# Patient Record
Sex: FEMALE | Race: WHITE | URBAN - METROPOLITAN AREA
[De-identification: names, ages, dates, MRNs, and addresses within clinical notes are randomized per-mention and may not be internally consistent; named-entity substitution may affect disease eponyms.]

---

## 2017-01-31 ENCOUNTER — OFFICE VISIT (OUTPATIENT)
Dept: INTERNAL MEDICINE CLINIC | Age: 37
End: 2017-01-31

## 2017-01-31 VITALS
TEMPERATURE: 97.8 F | SYSTOLIC BLOOD PRESSURE: 100 MMHG | HEART RATE: 68 BPM | WEIGHT: 135.8 LBS | BODY MASS INDEX: 21.83 KG/M2 | DIASTOLIC BLOOD PRESSURE: 62 MMHG | HEIGHT: 66 IN

## 2017-01-31 DIAGNOSIS — J01.90 ACUTE NON-RECURRENT SINUSITIS, UNSPECIFIED LOCATION: ICD-10-CM

## 2017-01-31 DIAGNOSIS — J40 BRONCHITIS: Primary | ICD-10-CM

## 2017-01-31 DIAGNOSIS — R05.9 COUGH: ICD-10-CM

## 2017-01-31 PROCEDURE — 99213 OFFICE O/P EST LOW 20 MIN: CPT | Performed by: INTERNAL MEDICINE

## 2017-01-31 RX ORDER — FLUTICASONE PROPIONATE 50 MCG
2 SPRAY, SUSPENSION (ML) NASAL DAILY
Qty: 1 BOTTLE | Refills: 0 | Status: SHIPPED | OUTPATIENT
Start: 2017-01-31

## 2017-01-31 RX ORDER — DOXYCYCLINE HYCLATE 100 MG/1
100 CAPSULE ORAL 2 TIMES DAILY
Qty: 20 CAPSULE | Refills: 0 | Status: SHIPPED | OUTPATIENT
Start: 2017-01-31 | End: 2017-02-10

## 2017-01-31 ASSESSMENT — ENCOUNTER SYMPTOMS
SINUS PRESSURE: 1
COUGH: 1
STRIDOR: 0
WHEEZING: 0

## 2022-11-04 ENCOUNTER — OFFICE VISIT (OUTPATIENT)
Dept: ORTHOPEDIC SURGERY | Age: 42
End: 2022-11-04
Payer: COMMERCIAL

## 2022-11-04 VITALS — WEIGHT: 140 LBS | HEIGHT: 66 IN | BODY MASS INDEX: 22.5 KG/M2 | RESPIRATION RATE: 12 BRPM

## 2022-11-04 DIAGNOSIS — M25.561 ACUTE PAIN OF RIGHT KNEE: Primary | ICD-10-CM

## 2022-11-04 DIAGNOSIS — M25.562 ACUTE PAIN OF LEFT KNEE: ICD-10-CM

## 2022-11-04 DIAGNOSIS — S83.241A ACUTE MEDIAL MENISCUS TEAR, RIGHT, INITIAL ENCOUNTER: ICD-10-CM

## 2022-11-04 PROCEDURE — 99203 OFFICE O/P NEW LOW 30 MIN: CPT | Performed by: ORTHOPAEDIC SURGERY

## 2022-11-04 NOTE — PROGRESS NOTES
Catarina Rueda seen today for ongoing bilateral knee pain. Right is much worse than left. She started having pain in April. She participates in curling. She has medial knee pain associated with squatting. She injured her left knee in July twisting injury on a water slide. From a right knee standpoint she was treated in physical therapy in April of this year and felt better but when she returned to running and curling had recurrent pain. Pain is 3 or 4 out of 10. Its anterior medial.  It hurts with deep bends. She works in logistics. She is otherwise healthy. She has never had knee surgery. She takes intermittent ibuprofen and uses ice. History: Patient's relevant past family, medical, and social history are reviewed as part of today's visit. ROS of pertinent positives and negatives as above; otherwise negative. General Exam:    Vitals: Resp. rate 12, height 5' 6\" (1.676 m), weight 140 lb (63.5 kg), not currently breastfeeding. Constitutional: Patient is adequately groomed with no evidence of malnutrition  Mental Status: The patient is oriented to time, place and person. The patient's mood and affect are appropriate. Gait:  Patient walks with normal gait and station. Lymphatic: The lymphatic examination bilaterally reveals all areas to be without enlargement or induration. Vascular: Examination reveals no swelling or calf tenderness. Peripheral pulses are palpable and 2+. Neurological: The patient has good coordination. There is no weakness or sensory deficit. Skin:    Head/Neck: inspection reveals no rashes, ulcerations or lesions. Trunk:  inspection reveals no rashes, ulcerations or lesions. Right Lower Extremity: inspection reveals no rashes, ulcerations or lesions. Left Lower Extremity: inspection reveals no rashes, ulcerations or lesions. Examination of the bilateral hips reveals normal flexion and extension. There is no restriction in rotation.   There is no tenderness to palpation anteriorly posteriorly or laterally. Left knee shows full range of motion with no swelling. She has no joint line pain. She has very mild discomfort over the anterior aspect of the knee with palpation. She is stable. Calf is soft. Right knee has restriction of flexion 0 to 120 degrees which is about 15 to 20 degrees less than the left. She is stable. She has pain over the medial joint line and pain with June's maneuver. Calf is soft. X-rays were obtained today in the office and interpreted by me AP standing, PA flex, merchant, and lateral views of both knees. These demonstrate: No bony abnormalities      Assessment: Right knee restriction of motion with medial sided pain concerning for medial meniscus tear. She has had physical therapy without durable relief. She has been using ice and ibuprofen but still has symptoms and abnormal physical exam.    Plan: MRI right knee.     Follow-up with me after the scan

## 2022-11-15 ENCOUNTER — OFFICE VISIT (OUTPATIENT)
Dept: ORTHOPEDIC SURGERY | Age: 42
End: 2022-11-15
Payer: COMMERCIAL

## 2022-11-15 VITALS — BODY MASS INDEX: 22.5 KG/M2 | WEIGHT: 140 LBS | HEIGHT: 66 IN

## 2022-11-15 DIAGNOSIS — S83.241A ACUTE MEDIAL MENISCUS TEAR, RIGHT, INITIAL ENCOUNTER: Primary | ICD-10-CM

## 2022-11-15 DIAGNOSIS — M25.561 ACUTE PAIN OF RIGHT KNEE: ICD-10-CM

## 2022-11-15 PROCEDURE — E0114 CRUTCH UNDERARM PAIR NO WOOD: HCPCS | Performed by: ORTHOPAEDIC SURGERY

## 2022-11-15 PROCEDURE — 99214 OFFICE O/P EST MOD 30 MIN: CPT | Performed by: ORTHOPAEDIC SURGERY

## 2022-11-15 NOTE — PROGRESS NOTES
Stephie Ortiz returns today to follow-up her right knee. Pain is about 1 out of 10. General Exam:    Vitals: Height 5' 6\" (1.676 m), weight 140 lb (63.5 kg), not currently breastfeeding. Constitutional: Patient is adequately groomed with no evidence of malnutrition  Mental Status: The patient is oriented to time, place and person. The patient's mood and affect are appropriate. Right knee today has pain over the medial joint line and pain with June's maneuver with a small effusion. She has mild crepitation. Calf is soft. She is stable. Right knee MRI is reviewed. It demonstrates:      Exam Date: 11/10/2022   Exam Description: MR Right Knee w/o Contrast            HISTORY:  Acute pain of right knee. Evaluate for right knee pain. TECHNICAL FACTORS:  Long- and short-axis fat- and water-weighted images were performed. COMPARISON:  None. FINDINGS:  ACL, PCL, LCL, MCL, flexor mechanism and extensor mechanism are intact. Thickened inflamed MCL. Sprain, scarring and capsulitis present. Edema changes within the medial tibia. Subcortical stress or insufficiency microfracturing    present. Dissecting dehisced Baker's cyst posteromedially. Lateral meniscus is intact. Medial meniscus demonstrates a 3-4 cm trizonal tear posterior    body. Subchondral marrow changes medial tibia and femur may be reactive or be from contusion    or stress/insufficiency injury. CONCLUSION:   1. 3-4 cm trizonal tear posterior horn and body of the medial meniscus. 2. Grade 1 MCL sprain, scarring and capsulitis. 3. Edema changes within the medial tibia and less so femur. Subcortical stress or insufficiency    microfracturing present. 4. Dehisced or leaking Baker's cyst posteromedially. 5. 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        I reviewed these findings on the report and the images with the patient and personally interpreted the scan. Assessment: Traumatic medial meniscus tear right knee. Plan: Right knee arthroscopy with partial medial meniscectomy. We reviewed the risks, benefits, and alternatives to surgery. The alternatives include conservative management including medications, injections, and physical therapy as well as observation. Risks of surgery include but are not limited to persistent pain, instability, and reinjury. Risks also include risk of infection which could result in the need for further surgery and long-term use of antibiotics. Risks also include deep venous thromboses and pulmonary emboli. Risks also include problems with anesthesia including but not limited to cardiovascular compromise , stroke,  and death. The patient understands that the goal of surgery is to improve pain and function but that can never be guaranteed. She is already had therapy without substantial improvement. She is modified her life. She really wants to get back to curling. I demonstrated to her arthroscopic incisions. She understands her greatest risk of surgery is that of ongoing pain and worsening arthritis. She would still like to proceed as soon as possible. She understands a full return to crawling is on the order of 6 to 8 weeks. All questions have been answered.   I will see her in the operating room at some point in the next several weeks after she gets preoperative medical clearance

## 2022-11-18 ENCOUNTER — TELEPHONE (OUTPATIENT)
Dept: ORTHOPEDIC SURGERY | Age: 42
End: 2022-11-18

## 2022-11-18 NOTE — TELEPHONE ENCOUNTER
Auth: NPR  Date: 12/07/22  Reference # None  Spoke with: None  Type of SX: Outpatient  Location: [facility]  CPT: 99447   DX: [phone]  SX area: Rt knee  Insurance: [employer]
patient currently not on any antibiotics.

## 2022-11-30 NOTE — PROGRESS NOTES
C-diff Questionnaire:     * Admitted with diarrhea? [] YES    [x]  NO     *Prior history of C-Diff. In last 3 months? [] YES    [x]  NO     *Antibiotic use in the past 6-8 weeks? [x]  NO    []  YES      If yes, which: REASON_________________     *Prior hospitalization or nursing home in the last month? []  YES    [x]  NO     [facility]. .call before you fall    [address] time___945       Surgery [id number]    Do not eat or drink anything after 12:00 midnight prior to your surgery. This includes water chewing gum, mints and ice chips- the Day of Surgery. You may brush your teeth and gargle the morning of your surgery, but do not swallow the water     Please see your family doctor/pediatrician for a history and physical and/or questions concerning medications. Bring any test results/reports from your physicians office. If you are under the care of a heart doctor or specialist doctor, please be aware that you may be asked to them for clearance    You may be asked to stop blood thinners such as Coumadin, Plavix, Fragmin, Lovenox, etc., or any anti-inflammatories such as:  Aspirin, Ibuprofen, Advil, Naproxen prior to your surgery. We also ask that you stop any OTC medications such as fish oil, vitamin E, glucosamine, garlic, Multivitamins, COQ 10, etc.    We ask that you do not smoke 24 hours prior to surgery  We ask that you do not  drink any alcoholic beverages 24 hours prior to surgery     You must make arrangements for a responsible adult to take you home after your surgery. For your safety you will not be allowed to leave alone or drive yourself home. Your surgery will be cancelled if you do not have a ride home. Also for your safety, it is strongly suggested that someone stay with you the first 24 hours after your surgery.      A parent or legal guardian must accompany a child scheduled for surgery and plan to stay at the hospital until the child is discharged. Please do not bring other children with you. For your comfort, please wear simple loose fitting clothing to the hospital.  Please do not bring valuables. Do not wear any make-up or nail polish on your fingers or toes. For your safety, please do not wear any jewelry or body piercing's on the day of surgery. All jewelry must be removed. If you have dentures, they will be removed before going to operating room. For your convenience, we will provide you with a container. If you wear contact lenses or glasses, they will be removed, please bring a case for them. If you have a living will and a durable power of  for healthcare, please bring in a copy. As part of our patient safety program to minimize surgical site infections, we ask you to do the following:    Please notify your surgeon if you develop any illness between         now and the day of your surgery. This includes a cough, cold, fever, sore throat, nausea,         or vomiting, and diarrhea, etc.   Please notify your surgeon if you experience dizziness, shortness         of breath or blurred vision between now and the time of your surgery. Do not shave your operative site 96 hours prior to surgery. For face and neck surgery, men may use an electric razor 48 hours   prior to surgery. You may shower the night before surgery or the morning of   your surgery with an antibacterial soap. You will need to bring a photo ID and insurance card     If you use a C-pap or Bi-pap machine, please bring your machine with you to the hospital     Our goal is to provide you with excellent care, therefore, visitors will be limited to so that we may focus on providing this care for you. Please contact your surgeon office, if you have any further questions.                  [facility] phone number:  [phone] PAT fax number:  [phone]    Please note these are generalized instructions for all surgical cases, you may be provided with more specific instructions according to your surgery.

## 2022-12-06 ENCOUNTER — TELEPHONE (OUTPATIENT)
Dept: ORTHOPEDIC SURGERY | Age: 42
End: 2022-12-06

## 2022-12-06 ENCOUNTER — ANESTHESIA EVENT (OUTPATIENT)
Dept: OPERATING ROOM | Age: 42
End: 2022-12-06
Payer: COMMERCIAL

## 2022-12-06 DIAGNOSIS — M25.561 ACUTE PAIN OF RIGHT KNEE: ICD-10-CM

## 2022-12-06 DIAGNOSIS — S83.241A ACUTE MEDIAL MENISCUS TEAR, RIGHT, INITIAL ENCOUNTER: Primary | ICD-10-CM

## 2022-12-06 RX ORDER — HYDROCODONE BITARTRATE AND ACETAMINOPHEN 5; 325 MG/1; MG/1
1 TABLET ORAL EVERY 4 HOURS PRN
Qty: 20 TABLET | Refills: 0 | Status: SHIPPED | OUTPATIENT
Start: 2022-12-07 | End: 2022-12-12

## 2022-12-06 RX ORDER — PROMETHAZINE HYDROCHLORIDE 25 MG/1
25 TABLET ORAL EVERY 6 HOURS PRN
Qty: 30 TABLET | Refills: 0 | Status: SHIPPED | OUTPATIENT
Start: 2022-12-07 | End: 2022-12-13

## 2022-12-06 RX ORDER — DOCUSATE SODIUM 100 MG/1
100 CAPSULE, LIQUID FILLED ORAL 2 TIMES DAILY
Qty: 60 CAPSULE | Refills: 0 | Status: SHIPPED | OUTPATIENT
Start: 2022-12-07

## 2022-12-06 NOTE — TELEPHONE ENCOUNTER
Called and left message for patient. Patient will arrive at [facility] for surgery at [facility] with crutches. NPO at [facility].

## 2022-12-07 ENCOUNTER — ANESTHESIA (OUTPATIENT)
Dept: OPERATING ROOM | Age: 42
End: 2022-12-07
Payer: COMMERCIAL

## 2022-12-07 ENCOUNTER — HOSPITAL ENCOUNTER (OUTPATIENT)
Age: 42
Setting detail: OUTPATIENT SURGERY
Discharge: HOME | End: 2022-12-07
Attending: ORTHOPAEDIC SURGERY | Admitting: ORTHOPAEDIC SURGERY
Payer: COMMERCIAL

## 2022-12-07 VITALS
HEART RATE: 66 BPM | SYSTOLIC BLOOD PRESSURE: 115 MMHG | RESPIRATION RATE: 18 BRPM | OXYGEN SATURATION: 100 % | TEMPERATURE: 97.4 F | HEIGHT: 66 IN | BODY MASS INDEX: 22.5 KG/M2 | WEIGHT: 140 LBS | DIASTOLIC BLOOD PRESSURE: 62 MMHG

## 2022-12-07 LAB — PREGNANCY, URINE: NEGATIVE

## 2022-12-07 PROCEDURE — 2500000003 HC RX 250 WO HCPCS: Performed by: ORTHOPAEDIC SURGERY

## 2022-12-07 PROCEDURE — 6360000002 HC RX W HCPCS: Performed by: ORTHOPAEDIC SURGERY

## 2022-12-07 PROCEDURE — 3600000014 HC SURGERY LEVEL 4 ADDTL 15MIN: Performed by: ORTHOPAEDIC SURGERY

## 2022-12-07 PROCEDURE — 6360000002 HC RX W HCPCS

## 2022-12-07 PROCEDURE — 7100000000 HC PACU RECOVERY - FIRST 15 MIN: Performed by: ORTHOPAEDIC SURGERY

## 2022-12-07 PROCEDURE — 2580000003 HC RX 258: Performed by: ANESTHESIOLOGY

## 2022-12-07 PROCEDURE — 2500000003 HC RX 250 WO HCPCS

## 2022-12-07 PROCEDURE — 7100000001 HC PACU RECOVERY - ADDTL 15 MIN: Performed by: ORTHOPAEDIC SURGERY

## 2022-12-07 PROCEDURE — 6370000000 HC RX 637 (ALT 250 FOR IP): Performed by: ANESTHESIOLOGY

## 2022-12-07 PROCEDURE — 2580000003 HC RX 258: Performed by: ORTHOPAEDIC SURGERY

## 2022-12-07 PROCEDURE — 7100000010 HC PHASE II RECOVERY - FIRST 15 MIN: Performed by: ORTHOPAEDIC SURGERY

## 2022-12-07 PROCEDURE — 2709999900 HC NON-CHARGEABLE SUPPLY: Performed by: ORTHOPAEDIC SURGERY

## 2022-12-07 PROCEDURE — 3600000004 HC SURGERY LEVEL 4 BASE: Performed by: ORTHOPAEDIC SURGERY

## 2022-12-07 PROCEDURE — 7100000011 HC PHASE II RECOVERY - ADDTL 15 MIN: Performed by: ORTHOPAEDIC SURGERY

## 2022-12-07 PROCEDURE — 3700000001 HC ADD 15 MINUTES (ANESTHESIA): Performed by: ORTHOPAEDIC SURGERY

## 2022-12-07 PROCEDURE — 3700000000 HC ANESTHESIA ATTENDED CARE: Performed by: ORTHOPAEDIC SURGERY

## 2022-12-07 PROCEDURE — 2720000010 HC SURG SUPPLY STERILE: Performed by: ORTHOPAEDIC SURGERY

## 2022-12-07 PROCEDURE — 84703 CHORIONIC GONADOTROPIN ASSAY: CPT

## 2022-12-07 RX ORDER — DEXAMETHASONE SODIUM PHOSPHATE 4 MG/ML
INJECTION, SOLUTION INTRA-ARTICULAR; INTRALESIONAL; INTRAMUSCULAR; INTRAVENOUS; SOFT TISSUE PRN
Status: DISCONTINUED | OUTPATIENT
Start: 2022-12-07 | End: 2022-12-07 | Stop reason: SDUPTHER

## 2022-12-07 RX ORDER — FENTANYL CITRATE 50 UG/ML
50 INJECTION, SOLUTION INTRAMUSCULAR; INTRAVENOUS EVERY 5 MIN PRN
Status: DISCONTINUED | OUTPATIENT
Start: 2022-12-07 | End: 2022-12-07 | Stop reason: HOSPADM

## 2022-12-07 RX ORDER — ONDANSETRON 2 MG/ML
4 INJECTION INTRAMUSCULAR; INTRAVENOUS
Status: DISCONTINUED | OUTPATIENT
Start: 2022-12-07 | End: 2022-12-07 | Stop reason: HOSPADM

## 2022-12-07 RX ORDER — SODIUM CHLORIDE 0.9 % (FLUSH) 0.9 %
5-40 SYRINGE (ML) INJECTION EVERY 12 HOURS SCHEDULED
Status: DISCONTINUED | OUTPATIENT
Start: 2022-12-07 | End: 2022-12-07 | Stop reason: HOSPADM

## 2022-12-07 RX ORDER — PROPOFOL 10 MG/ML
INJECTION, EMULSION INTRAVENOUS PRN
Status: DISCONTINUED | OUTPATIENT
Start: 2022-12-07 | End: 2022-12-07 | Stop reason: SDUPTHER

## 2022-12-07 RX ORDER — SODIUM CHLORIDE, SODIUM LACTATE, POTASSIUM CHLORIDE, CALCIUM CHLORIDE 600; 310; 30; 20 MG/100ML; MG/100ML; MG/100ML; MG/100ML
INJECTION, SOLUTION INTRAVENOUS CONTINUOUS PRN
Status: COMPLETED | OUTPATIENT
Start: 2022-12-07 | End: 2022-12-07

## 2022-12-07 RX ORDER — SODIUM CHLORIDE 9 MG/ML
INJECTION, SOLUTION INTRAVENOUS PRN
Status: DISCONTINUED | OUTPATIENT
Start: 2022-12-07 | End: 2022-12-07 | Stop reason: HOSPADM

## 2022-12-07 RX ORDER — OXYCODONE HYDROCHLORIDE 5 MG/1
5 TABLET ORAL PRN
Status: COMPLETED | OUTPATIENT
Start: 2022-12-07 | End: 2022-12-07

## 2022-12-07 RX ORDER — MIDAZOLAM HYDROCHLORIDE 1 MG/ML
INJECTION INTRAMUSCULAR; INTRAVENOUS PRN
Status: DISCONTINUED | OUTPATIENT
Start: 2022-12-07 | End: 2022-12-07 | Stop reason: SDUPTHER

## 2022-12-07 RX ORDER — FENTANYL CITRATE 50 UG/ML
INJECTION, SOLUTION INTRAMUSCULAR; INTRAVENOUS PRN
Status: DISCONTINUED | OUTPATIENT
Start: 2022-12-07 | End: 2022-12-07 | Stop reason: SDUPTHER

## 2022-12-07 RX ORDER — MEPERIDINE HYDROCHLORIDE 25 MG/ML
12.5 INJECTION INTRAMUSCULAR; INTRAVENOUS; SUBCUTANEOUS EVERY 5 MIN PRN
Status: DISCONTINUED | OUTPATIENT
Start: 2022-12-07 | End: 2022-12-07 | Stop reason: HOSPADM

## 2022-12-07 RX ORDER — SODIUM CHLORIDE 0.9 % (FLUSH) 0.9 %
5-40 SYRINGE (ML) INJECTION PRN
Status: DISCONTINUED | OUTPATIENT
Start: 2022-12-07 | End: 2022-12-07 | Stop reason: HOSPADM

## 2022-12-07 RX ORDER — FENTANYL CITRATE 50 UG/ML
25 INJECTION, SOLUTION INTRAMUSCULAR; INTRAVENOUS EVERY 5 MIN PRN
Status: DISCONTINUED | OUTPATIENT
Start: 2022-12-07 | End: 2022-12-07 | Stop reason: HOSPADM

## 2022-12-07 RX ORDER — OXYCODONE HYDROCHLORIDE 10 MG/1
10 TABLET ORAL PRN
Status: COMPLETED | OUTPATIENT
Start: 2022-12-07 | End: 2022-12-07

## 2022-12-07 RX ORDER — ONDANSETRON 2 MG/ML
INJECTION INTRAMUSCULAR; INTRAVENOUS PRN
Status: DISCONTINUED | OUTPATIENT
Start: 2022-12-07 | End: 2022-12-07 | Stop reason: SDUPTHER

## 2022-12-07 RX ORDER — BUPIVACAINE HYDROCHLORIDE AND EPINEPHRINE 2.5; 5 MG/ML; UG/ML
INJECTION, SOLUTION EPIDURAL; INFILTRATION; INTRACAUDAL; PERINEURAL
Status: COMPLETED | OUTPATIENT
Start: 2022-12-07 | End: 2022-12-07

## 2022-12-07 RX ORDER — LIDOCAINE HYDROCHLORIDE 20 MG/ML
INJECTION, SOLUTION EPIDURAL; INFILTRATION; INTRACAUDAL; PERINEURAL PRN
Status: DISCONTINUED | OUTPATIENT
Start: 2022-12-07 | End: 2022-12-07 | Stop reason: SDUPTHER

## 2022-12-07 RX ADMIN — DEXAMETHASONE SODIUM PHOSPHATE 10 MG: 4 INJECTION, SOLUTION INTRAMUSCULAR; INTRAVENOUS at 11:27

## 2022-12-07 RX ADMIN — OXYCODONE 5 MG: 5 TABLET ORAL at 12:53

## 2022-12-07 RX ADMIN — SODIUM CHLORIDE: 9 INJECTION, SOLUTION INTRAVENOUS at 11:19

## 2022-12-07 RX ADMIN — PROPOFOL 200 MG: 10 INJECTION, EMULSION INTRAVENOUS at 11:24

## 2022-12-07 RX ADMIN — PROPOFOL 50 MG: 10 INJECTION, EMULSION INTRAVENOUS at 11:25

## 2022-12-07 RX ADMIN — LIDOCAINE HYDROCHLORIDE 60 MG: 20 INJECTION, SOLUTION EPIDURAL; INFILTRATION; INTRACAUDAL; PERINEURAL at 11:24

## 2022-12-07 RX ADMIN — MIDAZOLAM 2 MG: 1 INJECTION INTRAMUSCULAR; INTRAVENOUS at 11:19

## 2022-12-07 RX ADMIN — FENTANYL CITRATE 50 MCG: 50 INJECTION INTRAMUSCULAR; INTRAVENOUS at 11:28

## 2022-12-07 RX ADMIN — FENTANYL CITRATE 50 MCG: 50 INJECTION INTRAMUSCULAR; INTRAVENOUS at 11:38

## 2022-12-07 RX ADMIN — CEFAZOLIN 2000 MG: 2 INJECTION, POWDER, FOR SOLUTION INTRAMUSCULAR; INTRAVENOUS at 11:26

## 2022-12-07 RX ADMIN — ONDANSETRON 4 MG: 2 INJECTION INTRAMUSCULAR; INTRAVENOUS at 11:39

## 2022-12-07 ASSESSMENT — PAIN DESCRIPTION - FREQUENCY
FREQUENCY: INTERMITTENT
FREQUENCY: INTERMITTENT
FREQUENCY: CONTINUOUS
FREQUENCY: INTERMITTENT

## 2022-12-07 ASSESSMENT — PAIN DESCRIPTION - ONSET
ONSET: ON-GOING

## 2022-12-07 ASSESSMENT — LIFESTYLE VARIABLES: SMOKING_STATUS: 0

## 2022-12-07 ASSESSMENT — PAIN - FUNCTIONAL ASSESSMENT
PAIN_FUNCTIONAL_ASSESSMENT: PREVENTS OR INTERFERES SOME ACTIVE ACTIVITIES AND ADLS
PAIN_FUNCTIONAL_ASSESSMENT: 0-10

## 2022-12-07 ASSESSMENT — PAIN DESCRIPTION - ORIENTATION
ORIENTATION: RIGHT

## 2022-12-07 ASSESSMENT — PAIN DESCRIPTION - LOCATION
LOCATION: KNEE

## 2022-12-07 ASSESSMENT — PAIN DESCRIPTION - PAIN TYPE
TYPE: SURGICAL PAIN

## 2022-12-07 ASSESSMENT — ENCOUNTER SYMPTOMS: SHORTNESS OF BREATH: 0

## 2022-12-07 ASSESSMENT — PAIN DESCRIPTION - DESCRIPTORS
DESCRIPTORS: ACHING

## 2022-12-07 ASSESSMENT — PAIN SCALES - GENERAL
PAINLEVEL_OUTOF10: 4
PAINLEVEL_OUTOF10: 5
PAINLEVEL_OUTOF10: 6
PAINLEVEL_OUTOF10: 4
PAINLEVEL_OUTOF10: 0

## 2022-12-07 NOTE — H&P
Enrique Ayon was seen and examined. Right knee marked.   Risk, benefits, and alternatives to surgery have been discussed at length and patient ready to proceed with right knee arthroscopy

## 2022-12-07 NOTE — PROGRESS NOTES
Patient to PACU from OR. Oral airway in place. Patient on 4L NC on arrival. VSS. Will contnue to monitor.

## 2022-12-07 NOTE — DISCHARGE INSTR - DIET
Good nutrition is important when healing from an illness, injury, or surgery. Follow any nutrition recommendations given to you during your hospital stay. If you were given an oral nutrition supplement while in the hospital, continue to take this supplement at home. You can take it with meals, in-between meals, and/or before bedtime. These supplements can be purchased at most local grocery stores, pharmacies, and chain [employer]-stores. If you have any questions about your diet or nutrition, call the hospital and ask for the dietitian. Advance to regular diet as tolerated.

## 2022-12-07 NOTE — ANESTHESIA PRE PROCEDURE
Department of Anesthesiology  Preprocedure Note       Name:  Dilia Vazquez   Age:  43 y.o.  :  1980                                          MRN:  [id number]         Date:  2022      Surgeon: Derek Burgos):  Tyler Adhikari MD    Procedure: Procedure(s):  RIGHT KNEE ARTHROSCOPY, MEDIAL MENISCECTOMY    Medications prior to admission:   Prior to Admission medications    Medication Sig Start Date End Date Taking? Authorizing Provider   docusate sodium (COLACE) 100 MG capsule Take 1 capsule by mouth 2 times daily Post-op 22   Tyler Adhikari MD   HYDROcodone-acetaminophen (NORCO) 5-325 MG per tablet Take 1 tablet by mouth every 4 hours as needed for Pain for up to 5 days. Post-op 22  Tyler Adhikari MD   promethazine (PHENERGAN) 25 MG tablet Take 1 tablet by mouth every 6 hours as needed for Nausea Post-op 22  Tyler Adhikari MD   Cholecalciferol (VITAMIN D) 10 MCG (400 UNIT) CAPS Capsule Take by mouth    Historical Provider, MD   Multiple Vitamins-Minerals (THERAPEUTIC MULTIVITAMIN-MINERALS) tablet Take 1 tablet by mouth daily    Historical Provider, MD       Current medications:    Current Facility-Administered Medications   Medication Dose Route Frequency Provider Last Rate Last Admin    sodium chloride flush 0.9 % injection 5-40 mL  5-40 mL IntraVENous 2 times per day Yonatan Carlos MD        sodium chloride flush 0.9 % injection 5-40 mL  5-40 mL IntraVENous PRN Yonatan Carlos MD        0.9 % sodium chloride infusion   IntraVENous PRN Yonatan Carlos MD        ceFAZolin (ANCEF) 2,000 mg in dextrose 5 % 50 mL IVPB (mini-bag)  2,000 mg IntraVENous Once Tyler Adhikari MD           Allergies:  No Known Allergies    Problem List:  There is no problem list on file for this patient. Past Medical History:        Diagnosis Date    Body piercing     Knee pain        Past Surgical History:  History reviewed. No pertinent surgical history.     Social History:    Social History Tobacco Use    Smoking status: Never    Smokeless tobacco: Never   Substance Use Topics    Alcohol use: Yes                                Counseling given: Not Answered      Vital Signs (Current):   Vitals:    11/30/22 1151 12/07/22 0959 12/07/22 1019   BP:   119/77   Pulse:   83   Resp:   18   Temp:   97.6 °F (36.4 °C)   TempSrc:   Oral   SpO2:   100%   Weight: 140 lb (63.5 kg) 140 lb (63.5 kg) 140 lb (63.5 kg)   Height: 5' 6\" (1.676 m)  5' 6\" (1.676 m)                                              BP Readings from Last 3 Encounters:   12/07/22 119/77   01/31/17 100/62   12/12/16 114/82       NPO Status: Time of last liquid consumption: 2100                        Time of last solid consumption: 2100                        Date of last liquid consumption: 12/06/22                        Date of last solid food consumption: 12/06/22    BMI:   Wt Readings from Last 3 Encounters:   12/07/22 140 lb (63.5 kg)   11/15/22 140 lb (63.5 kg)   11/04/22 140 lb (63.5 kg)     Body mass index is 22.6 kg/m². CBC:   Lab Results   Component Value Date/Time    WBC 5.3 12/12/2016 01:55 PM    RBC 4.46 12/12/2016 01:55 PM    HGB 13.5 12/12/2016 01:55 PM    HCT 40.5 12/12/2016 01:55 PM    MCV 90.8 12/12/2016 01:55 PM    RDW 12.4 12/12/2016 01:55 PM     12/12/2016 01:55 PM       CMP:   Lab Results   Component Value Date/Time     12/12/2016 01:55 PM    K 3.9 12/12/2016 01:55 PM     12/12/2016 01:55 PM    CO2 25 12/12/2016 01:55 PM    BUN 14 12/12/2016 01:55 PM    CREATININE 0.7 12/12/2016 01:55 PM    GFRAA >60 12/12/2016 01:55 PM    LABGLOM >60 12/12/2016 01:55 PM    GLUCOSE 82 12/12/2016 01:55 PM    CALCIUM 8.4 12/12/2016 01:55 PM       POC Tests: No results for input(s): POCGLU, POCNA, POCK, POCCL, POCBUN, POCHEMO, POCHCT in the last 72 hours.     Coags: No results found for: PROTIME, INR, APTT    HCG (If Applicable):   Lab Results   Component Value Date    PREGTESTUR Negative 12/07/2022        ABGs: No results found for: PHART, PO2ART, OAA3XQC, VPR6PQW, BEART, N5GKQZBD     Type & Screen (If Applicable):  No results found for: LABABO, LABRH    Drug/Infectious Status (If Applicable):  No results found for: HIV, HEPCAB    COVID-19 Screening (If Applicable): No results found for: COVID19        Anesthesia Evaluation  Patient summary reviewed and Nursing notes reviewed no history of anesthetic complications:   Airway: Mallampati: I  TM distance: >3 FB   Neck ROM: full  Mouth opening: > = 3 FB   Dental: normal exam         Pulmonary:Negative Pulmonary ROS breath sounds clear to auscultation      (-) pneumonia, COPD, asthma, shortness of breath, recent URI, sleep apnea and not a current smoker                           Cardiovascular:Negative CV ROS            Rhythm: regular                      Neuro/Psych:   Negative Neuro/Psych ROS              GI/Hepatic/Renal: Neg GI/Hepatic/Renal ROS            Endo/Other: Negative Endo/Other ROS                    Abdominal:             Vascular: negative vascular ROS. Other Findings:           Anesthesia Plan      general     ASA 1       Induction: intravenous. MIPS: Postoperative opioids intended, Prophylactic antiemetics administered and Postoperative trial extubation. Anesthetic plan and risks discussed with patient. Plan discussed with CRNA. Luís Palacios MD   12/7/2022        This pre-anesthesia assessment may be used as a history and physical.    DOS STAFF ADDENDUM:    Pt seen and examined, chart reviewed (including anesthesia, drug and allergy history). No interval changes to history and physical examination. Anesthetic plan, risks, benefits, alternatives, and personnel involved discussed with patient. Patient verbalized an understanding and agrees to proceed.       Luís Palacios MD  December 7, 2022  11:02 AM

## 2022-12-07 NOTE — PROGRESS NOTES
Pt sitting up in bed, states pain is tolerable at 4/10 at this time. VSS. No signs of distress started at this time. Pt states ready for lunch.

## 2022-12-07 NOTE — BRIEF OP NOTE
Brief Postoperative Note      Patient: Evy Becker  YOB: 1980  MRN: [id number]    Date of Procedure: 12/7/2022    Pre-Op Diagnosis: RIGHT KNEE MEDIAL MENISCUS TEAR    Post-Op Diagnosis: Same       Procedure(s):  RIGHT KNEE ARTHROSCOPY, MEDIAL MENISCECTOMY    Surgeon(s):  Martin Thacker MD    Assistant:  Surgical Assistant: Soheila Bryant    Anesthesia: General    Estimated Blood Loss (mL): Minimal    Complications: None    Specimens:   * No specimens in log *    Implants:  * No implants in log *      Drains: * No LDAs found *    Findings: medial mx tear    Electronically signed by Josh Hughes MD on 12/7/2022 at 11:42 AM

## 2022-12-08 NOTE — OP NOTE
[facility]           [address]                                OPERATIVE REPORT    PATIENT NAME: Abdoul Quick                   :        1980  MED REC NO:   [id number]                          ROOM:  ACCOUNT NO:   [de-identified]                           ADMIT DATE: 2022  PROVIDER:     Brian Zapata MD    DATE OF PROCEDURE:  2022    PREOPERATIVE DIAGNOSIS:  Right knee medial meniscus tear. POSTOPERATIVE DIAGNOSIS:  Right knee medial meniscus tear. OPERATION PERFORMED:  Arthroscopy of the right knee with partial medial  meniscectomy. SURGEON:  Brian Zapata MD    FIRST ASSISTANT:  Mr. Marycarmen Short. ANESTHESIA:  General.    ANTIBIOTICS:  Ancef. ESTIMATED BLOOD LOSS:  Minimal.    COMPLICATIONS:  None apparent. INDICATIONS FOR PROCEDURE:  The patient is an active participant in the  sport of curling and began having medial compartment knee pain with  kneeling and sliding. MRI scan showed a medial meniscus tear. She was  indicated for surgery. Understanding the risks, benefits, and  alternatives of the operation, she was eager to proceed. OPERATIVE PROCEDURE:  The patient was identified in the preop holding  area. Informed consent was obtained. Operative site of the right knee  was marked by me with my initials. She was brought to the operating  room, placed under general anesthesia. Examination of the right knee  under anesthesia revealed stable complete motion. After sterile prep  and drape, a time-out confirmed appropriate patient, positioning,  operative site, and availability of instrumentation, arthroscopy was  commenced with the scope in the anterolateral portal.  An anteromedial  working portal created from outside-in under needle guidance. Findings  as below:  1. Grade 3 change in the patella. 2.  Grade 2 change in the trochlea.   3.  Grade 1 and 2 softening in the medial femoral condyle and grade 2  and 3 change in the medial tibial plateau. 4.  Undersurface tear of the posterior horn and body of the medial  meniscus that was unstable and irreparable. 5.  Intact ACL and PCL. 6.  Normal lateral compartment. After thoroughly evaluating the entirety of the intraarticular space, I  reduced the meniscus tear where it did flip into the gutter. After reducing it we resected it using a combination of basket forceps and a suction  shaver back to a nice stable edge. Then, I did a light chondroplasty of  the patellofemoral joint. At this point, the instruments were  withdrawn. The knee was evacuated of fluid. Portals were closed with  nylon. Steri-Strips, sterile dressing, and Ace bandage were applied. The patient was awoken and transported to Recovery without complication.         Shea Ramirez MD    D: 12/07/2022 11:55:34       T: 12/07/2022 17:30:23     [id number]  Job#: [id number]     Doc#: [id number]    CC:

## 2022-12-09 ENCOUNTER — OFFICE VISIT (OUTPATIENT)
Dept: ORTHOPEDIC SURGERY | Age: 42
End: 2022-12-09

## 2022-12-09 ENCOUNTER — HOSPITAL ENCOUNTER (OUTPATIENT)
Dept: PHYSICAL THERAPY | Age: 42
Setting detail: THERAPIES SERIES
Discharge: HOME | End: 2022-12-09
Payer: COMMERCIAL

## 2022-12-09 VITALS — WEIGHT: 140 LBS | RESPIRATION RATE: 12 BRPM | BODY MASS INDEX: 22.5 KG/M2 | HEIGHT: 66 IN

## 2022-12-09 DIAGNOSIS — S83.241D ACUTE MEDIAL MENISCUS TEAR, RIGHT, SUBSEQUENT ENCOUNTER: Primary | ICD-10-CM

## 2022-12-09 DIAGNOSIS — M25.561 ACUTE PAIN OF RIGHT KNEE: ICD-10-CM

## 2022-12-09 PROCEDURE — 97112 NEUROMUSCULAR REEDUCATION: CPT | Performed by: PHYSICAL THERAPIST

## 2022-12-09 PROCEDURE — 97530 THERAPEUTIC ACTIVITIES: CPT | Performed by: PHYSICAL THERAPIST

## 2022-12-09 PROCEDURE — 97161 PT EVAL LOW COMPLEX 20 MIN: CPT | Performed by: PHYSICAL THERAPIST

## 2022-12-09 PROCEDURE — 99024 POSTOP FOLLOW-UP VISIT: CPT | Performed by: ORTHOPAEDIC SURGERY

## 2022-12-09 PROCEDURE — 97016 VASOPNEUMATIC DEVICE THERAPY: CPT | Performed by: PHYSICAL THERAPIST

## 2022-12-09 NOTE — FLOWSHEET NOTE
[address] [facility] a Department of [address]  [facility] [address], [address]  Office: [phone]  Fax:  [phone]                                                      Physical Therapy Treatment Note/ Progress Report:      Date:  2022    Patient Name:  Evy Becker    :  1980  MRN: [id number]  Restrictions/Precautions:    Medical/Treatment Diagnosis Information:  Diagnosis: S83.241 (ICD-10-CM) - Acute medial meniscus tear of right knee. S/p medial meniscectomy. Surgery on 22  Treatment Diagnosis: M25.561 - Pain in right knee  Insurance/Certification information:  PT Insurance Information: [facility]  Physician Information:  Referring Provider (secondary):  Martin Thacker MD  Has the plan of care been signed (Y/N):        []  Yes  [x]  No     Date of Patient follow up with Physician: 16 Dec 2022      Is this a Progress Report:     []  Yes  [x]  No        If Yes:  Date Range for reporting period:  Beginning 2022  Ending    Progress report will be due (10 Rx or 30 days whichever is less): 2023             Visit # Insurance Allowable Auth Required   1 (4 prior used) 100   []  Yes [x]  No          Functional Scale: FOTO-29   Date assessed: 2022      Latex Allergy:  [x]NO      []YES  Preferred Language for Healthcare:   [x]English       []other:      Pain level:  See eval     SUBJECTIVE:  See eval    OBJECTIVE: See eval  Observation:   Test measurements:          Flexibility L R Comment   Hamstring      Gastroc      ITB      Quad                ROM PROM AROM Overpressure Comment    L R L R L R    Flexion          Extension                                  Strength L R Comment   Quad      Hamstring      Gastroc      Hip flexor      Hip ABD                          RESTRICTIONS/PRECAUTIONS: R Medial Meniscectomy on 2022     Exercises/Interventions:     Exercise/Equipment Resistance and Repetitions Other comments   Stretching     Hamstring 3 x 30 seconds    Hip Flexion     ITB- Rope     Grion     Quad     Inclined Calf     Towel Pull 3 x 30 seconds    Piriformis     Seated Flexion  EOB LLE support             SLR     Supine 10 reps    Prone     Abduction 10 reps    Adducton     SLR+          Isometrics     Quad sets 10 x 10\"    Ball Squeezes 10 x 10\"    Patellar Glides     Medial     Superior     Inferior          ROM     Passive     Active     Weight Shift     Hang Weights     Sheet Pulls 10 x 10\"    Ankle Pumps 3 x 10 reps    EOB ROM 2' EOB LLE support        CKC     Calf raises     Wall sits     Step ups     1 leg stand     Squatting     CC TKE     Balance     Monster Walks     Bridging     Triple threats     Stool Scoots     PRE     Extension  RANGE:   Flexion  RANGE:        Cable Column          Leg Press  RANGE:        Bike     Treadmill            Therapeutic Exercise and NMR EXR  [x] (36459) Provided verbal/tactile cueing for activities related to strengthening, flexibility, endurance, ROM for improvements in LE, proximal hip, and core control with self care, mobility, lifting, ambulation. [x] (80577) Provided verbal/tactile cueing for activities related to improving balance, coordination, kinesthetic sense, posture, motor skill, proprioception  to assist with LE, proximal hip, and core control in self care, mobility, lifting, ambulation and eccentric single leg control.      NMR and Therapeutic Activities:    [x] (26414 or 80265) Provided verbal/tactile cueing for activities related to improving balance, coordination, kinesthetic sense, posture, motor skill, proprioception and motor activation to allow for proper function of core, proximal hip and LE with self care and ADLs  [x] (87517) Gait Re-education- Provided training and instruction to the patient for proper LE, core and proximal hip recruitment and positioning and eccentric body weight control with ambulation re-education including up and down stairs     Home Exercise Program:    [x] (07284) Reviewed/Progressed HEP activities related to strengthening, flexibility, endurance, ROM of core, proximal hip and LE for functional self-care, mobility, lifting and ambulation/stair navigation   [x] (57911)Reviewed/Progressed HEP activities related to improving balance, coordination, kinesthetic sense, posture, motor skill, proprioception of core, proximal hip and LE for self care, mobility, lifting, and ambulation/stair navigation      Access Code: [email or web address]  URL: [email or web address]  Date: 12/09/2022  Prepared by: Jerry Jaramillo    Exercises  Seated Table Hamstring Stretch - 2 x daily - 7 x weekly - 5 sets - 30 hold  Seated Gastroc Stretch with Strap - 2 x daily - 7 x weekly - 5 sets - 30 hold  Supine Single Leg Ankle Pumps - 10 x daily - 7 x weekly - 3 sets - 10 reps  Supine Quadricep Sets - 2 x daily - 7 x weekly - 10 sets - 1 reps - 10 hold  Supine Straight Leg Raises - 2 x daily - 7 x weekly - 3 sets - 10 reps  Sidelying Hip Abduction - 2 x daily - 7 x weekly - 3 sets - 10 reps  Supine Hip Adduction Isometric with Ball - 1 x daily - 7 x weekly - 10 sets - 10 hold  Supine Heel Slide with Strap - 2 x daily - 7 x weekly - 10 sets - 10 hold      Manual Treatments:  PROM / STM / Oscillations-Mobs:  G-I, II, III, IV (PA's, Inf., Post.)  [x] (99796) Provided manual therapy to mobilize LE, proximal hip and/or LS spine soft tissue/joints for the purpose of modulating pain, promoting relaxation,  increasing ROM, reducing/eliminating soft tissue swelling/inflammation/restriction, improving soft tissue extensibility and allowing for proper ROM for normal function with self care, mobility, lifting and ambulation. Other:  Patient education on PT and plan of care including diagnosis, prognosis, treatment goals and options. Patient agrees with discussed POC and treatment and is aware of rehab process.   Pt was also educated on clinic layout and use of modalities. PT gave pt business card to call if any questions. Modalities: 13' Game Ready for swelling and pain    Charges:   Timed Code Treatment Minutes: 25'   Total Treatment Minutes: 72'     [x] EVAL (LOW) 455 1011   [] EVAL (MOD) 30568   [] EVAL (HIGH) 08851   [] RE-EVAL   [] YG(06993) x     [] IONTO  [x] NMR (45848) x 1     [x] VASO  [] Manual (21513) x      [] Other:  [x] TA x  1    [] Mech Traction (31756)  [] ES(attended) (21656)      [] ES (un) (30388):     GOALS:   Patient stated goal:   Get onto the floor with her kids. Squat all the way down. Return to previous activities. [] Progressing: [] Met: [] Not Met: [] Adjusted     Therapist goals for Patient:   Short Term Goals: To be achieved in: 2 weeks  1. Independent in HEP and progression per patient tolerance, in order to prevent re-injury. [] Progressing: [] Met: [] Not Met: [] Adjusted   2. Pt will report pain at worst less than or equal to 4/10. [] Progressing: [] Met: [] Not Met: [] Adjusted  3. Pt will improve knee ROM to 90 degrees flexion. [] Progressing: [] Met: [] Not Met: [] Adjusted     Long Term Goals: To be achieved in: 8 weeks  1. Pt will demo a score 65 or better for FOTO to assist with reaching prior level of function. [] Progressing: [] Met: [] Not Met: [] Adjusted  2. Patient will demonstrate increased AROM to knee ext to equal to 0 and knee flex greater than or equal to 140-150 degrees to allow for proper joint functioning as indicated by patients Functional Deficits. [] Progressing: [] Met: [] Not Met: [] Adjusted  3. Patient will demonstrate an increase in strength to hip flex, hip ABD, and knee flex and ext strength to 4+/5 or HHD within 90% of contralateral limb to allow for proper functional mobility as indicated by patients functional deficits. [] Progressing: [] Met: [] Not Met: [] Adjusted  4.  Patient will return to walking with a normal gait pattern and kneeling down with her children without exacerbation of pain/discomfort in her knee. [] Progressing: [] Met: [] Not Met: [] Adjusted  5. Pt will report pain at worst less than or equal to 0/10. [] Progressing: [] Met: [] Not Met: [] Adjusted       6. 10-12 weeks: Patient will return to light recreation activities with curling, pickleball, running,etc.   [] Progressing: [] Met: [] Not Met: [] Adjusted      Overall Progression Towards Functional goals/ Treatment Progress Update:  [] Patient is progressing as expected towards functional goals listed. [] Progression is slowed due to complexities/Impairments listed. [] Progression has been slowed due to co-morbidities. [x] Plan just implemented, too soon to assess goals progression <30days   [] Goals require adjustment due to lack of progress  [] Patient is not progressing as expected and requires additional follow up with physician  [] Other    Prognosis for POC: [x] Good [] Fair  [] Poor      Patient requires continued skilled intervention: [x] Yes  [] No    Treatment/Activity Tolerance:  [x] Patient able to complete treatment  [] Patient limited by fatigue  [] Patient limited by pain     [] Patient limited by other medical complications  [] Other: Pt is a 44 y/o female presenting with diagnosis of s.p R medial meniscectomy from the MD.  Clinically, the pt presents with decreased ROM, decreased strength, decreased function, and increased pain consistent with the MD diagnosis. She tolerated extension-based portions of the treatment well, showing good quad activation and control with her SLR. She was very apprehensive to flexion with heel slides and was shown a different variation which was more tolerated but still limited. Her knee showed no signs of acute infection and no concerns of DVT. Pt education provided of signs and sx of infection and clots, as well as beneficial exercises to avoid clotting concerns.  The pt would benefit from skilled PT 1-2 times per week for 10-12 weeks to return to PLOF and gradual return to activity. Pt is fairly young with a high PLOF in which she was very active. She has no comorbidities that would be a major barrier to her surgical recovery. Her job is not physically demanding but she does have a lifestyle that is somewhat demanding. Her goals of returning to [facility] and being active with her kids will be a good facilitator of motivation but should also be avoided too soon to avoid reexacerbating her pain symptoms. We did review insurance benefits. All of pt's questions were answered. Pt was agreeable. PLAN: If pt doesn't return, this note can be considered a D/C note. See eval  [] Continue per plan of care [] Alter current plan (see comments above)  [x] Plan of care initiated [] Hold pending MD visit [] Discharge    Electronically signed by:    Speedy Davenport, Student Physical Therapist  Therapist was present, directed the patient's care, made skilled judgement, and was responsible for assessment and treatment of the patient.       Lesly Ga, PT, DPT, Board-Certified Clinical Specialist in Orthopaedic Physical Therapy [id number]

## 2022-12-09 NOTE — PLAN OF CARE
[facility] a Department of [address] [facility], [address]  Office: [phone]  Fax:  [phone]                                                          Physical Therapy Certification    Dear Referring Provider (secondary): Andreina Saha MD,    We had the pleasure of evaluating the following patient for physical therapy services at [address] [place] [address]. A summary of our findings can be found in the initial assessment below. This includes our plan of care. If you have any questions or concerns regarding these findings, please do not hesitate to contact me at the office phone number checked above. Thank you for the referral.       Physician Signature:_______________________________Date:__________________  By signing above (or electronic signature), therapists plan is approved by physician      Patient: Bala Raygoza   : 1980   MRN: [id number]  Referring Physician: Referring Provider (secondary): Andreina Saha MD      Evaluation Date: 2022      Medical Diagnosis Information:  Diagnosis: S83.241 (ICD-10-CM) - Acute medial meniscus tear of right knee   Treatment Diagnosis: M25.561 - Pain in right knee                                           Precautions/ Contra-indications: R medial meniscectomy on 2022  C-SSRS Triggered by Intake questionnaire (Past 2 wk assessment):   [x] No, Questionnaire did not trigger screening.   [] Yes, Patient intake triggered further evaluation      [] C-SSRS Screening completed  [] PCP notified via Plan of Care  [] Emergency services notified   Latex Allergy:  [x]NO      []YES  Preferred Language for Healthcare:   [x]English       []other:    SUBJECTIVE: Patient stated complaint:Surgical date was Dec 7 2022 for R medial meniscectomy  She initially injured the knee while curling in April. She tried PT in May and it felt better.  She went back to curling in October and re aggravated the knee, which led to surgery. She states she has been feeling fine since the surgery on Wednesday. 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 line    Functional Mobility/Transfers: see above    Posture:  WNL with use of B crutches    Bandages/Dressings/Incisions: Incisions closed well, minimal draining    Gait: (include devices/WB status) Ambulates with use of bilateral axillary crutches        Flexibility L R Comment   Hamstring      Gastroc      ITB      Quad                ROM PROM AROM Overpressure Comment    L R L R L R    Flexion  45 150       Extension   -5 hyper -2 hyper                              Strength L R Comment   Quad      Hamstring      Gastroc      Hip flexor      Hip ABD                    Orthopedic Special Tests:   Special Test Results/Comment   Meniscal Click    Crepitus    Flexion Test    Valgus Laxity    Varus Laxity    Lachmans    Drop Back    Homans            Girth L R   Mid Patella     Suprapatellar     5cm above     15cm above                                [x] Patient history, allergies, meds reviewed. Medical chart reviewed. See intake form. Review Of Systems (ROS):  [x]Performed Review of systems (Integumentary, CardioPulmonary, Neurological) by intake and observation. Intake form has been scanned into medical record. Patient has been instructed to contact their primary care physician regarding ROS issues if not already being addressed at this time. Co-morbidities/Complexities (which will affect course of rehabilitation):   []None           Arthritic conditions   []Rheumatoid arthritis (M05.9)  []Osteoarthritis (M19.91)   Cardiovascular conditions   []Hypertension (I10)  []Hyperlipidemia (E78.5)  []Angina pectoris (I20)  []Atherosclerosis (I70)   Musculoskeletal conditions   []Disc pathology   []Congenital spine pathologies   [x]Prior surgical intervention  []Osteoporosis (M81.8)  []Osteopenia (M85.8)   Endocrine conditions   []Hypothyroid (E03.9)  []Hyperthyroid Gastrointestinal conditions   []Constipation (Q37.85)   Metabolic conditions   []Morbid obesity (E66.01)  []Diabetes type 1(E10.65) or 2 (E11.65)   []Neuropathy (G60.9)     Pulmonary conditions   []Asthma (J45)  []Coughing   []COPD (J44.9)   Psychological Disorders  []Anxiety (F41.9)  []Depression (F32.9)   []Other:   []Other:          Barriers to/and or personal factors that will affect rehab potential:              [x]Age  []Sex              [x]Motivation/Lack of Motivation                        [x]Co-Morbidities              []Cognitive Function, education/learning barriers              [x]Environmental, home barriers              []profession/work barriers  []past PT/medical experience  []other:  Justification: Pt is fairly young with with a high PLOF in which she was very active. She has no comorbidities that would be a major barrier to her surgical recovery. Her job is not physically demanding but she does have a lifestyle that is somewhat demanding.  Her goals of returning to [facility] and being active with her kids will be a good facilitator of motivation but should also be avoided too soon to avoid reexacerbating her pain symptoms. Falls Risk Assessment (30 days):   [x] Falls Risk assessed and no intervention required. [] Falls Risk assessed and Patient requires intervention due to being higher risk   TUG score (>12s at risk):     [] Falls education provided, including         ASSESSMENT:   Functional Impairments:     []Noted lumbar/proximal hip/LE joint hypomobility   [x]Decreased LE functional ROM   [x]Decreased core/proximal hip strength and neuromuscular control   [x]Decreased LE functional strength   [x]Reduced balance/proprioceptive control   []other:      Functional Activity Limitations (from functional questionnaire and intake)   [x]Reduced ability to tolerate prolonged functional positions   []Reduced ability or difficulty with changes of positions or transfers between positions   []Reduced ability to maintain good posture and demonstrate good body mechanics with sitting, bending, and lifting   [x]Reduced ability to sleep   [x] Reduced ability or tolerance with driving and/or computer work   [x]Reduced ability to perform lifting, carrying tasks   [x]Reduced ability to squat   [x]Reduced ability to forward bend   [x]Reduced ability to ambulate prolonged functional periods/distances/surfaces   [x]Reduced ability to ascend/descend stairs   [x]Reduced ability to run, hop, cut or jump   []other:    Participation Restrictions   []Reduced participation in self care activities   [x]Reduced participation in home management activities   []Reduced participation in work activities   [x]Reduced participation in social activities. [x]Reduced participation in sport/recreation activities. Classification :    [x]Signs/symptoms consistent with post-surgical status including decreased ROM, strength and function.    []Signs/symptoms consistent with joint sprain/strain   []Signs/symptoms consistent with patella-femoral syndrome   []Signs/symptoms consistent with knee OA/hip OA   []Signs/symptoms consistent with internal derangement of knee/Hip   []Signs/symptoms consistent with functional hip weakness/NMR control      []Signs/symptoms consistent with tendinitis/tendinosis    []signs/symptoms consistent with pathology which may benefit from Dry needling      [x]other: Pt is a 44 y/o female presenting with diagnosis of s.p R medial meniscectomy from the MD.  Clinically, the pt presents with decreased ROM, decreased strength, decreased function, and increased pain consistent with the MD diagnosis. She tolerated extension-based portions of the treatment well, showing good quad activation and control with her SLR. She was very apprehensive to flexion with heel slides and was shown a different variation which was more tolerated but still limited. Her knee showed no signs of acute infection and no concerns of DVT. Pt education provided of signs and sx of infection and clots, as well as beneficial exercises to avoid clotting concerns. The pt would benefit from skilled PT 1-2 times per week for 10-12 weeks to return to PLOF and gradual return to activity. Pt is fairly young with a high PLOF in which she was very active. She has no comorbidities that would be a major barrier to her surgical recovery. Her job is not physically demanding but she does have a lifestyle that is somewhat demanding. Her goals of returning to [facility] and being active with her kids will be a good facilitator of motivation but should also be avoided too soon to avoid reexacerbating her pain symptoms. We did review insurance benefits. All of pt's questions were answered. Pt was agreeable.        Prognosis/Rehab Potential:      [x]Excellent   [x]Good    []Fair   []Poor    Tolerance of evaluation/treatment:    []Excellent   [x]Good    []Fair   []Poor    PLAN  Frequency/Duration:  2 days per week for 6-8 Weeks:  Interventions:  [x]  Therapeutic exercise including: strength training, ROM, for Lower extremity and core   [x]  NMR activation and proprioception for LE, Glutes and Core   [x]  Manual therapy as indicated for LE, Hip and spine to include: Dry Needling/IASTM, STM, PROM, Gr I-IV mobilizations, manipulation. [x] Modalities as needed that may include: thermal agents, E-stim, Biofeedback, US, iontophoresis as indicated  [x] Patient education on joint protection, postural re-education, activity modification, progression of HEP. HEP instruction: (see scanned forms)   Access Code: [email or web address]  URL: [email or web address]  Date: 12/09/2022  Prepared by: Nelli Jaramillo    Exercises  Seated Table Hamstring Stretch - 2 x daily - 7 x weekly - 5 sets - 30 hold  Seated Gastroc Stretch with Strap - 2 x daily - 7 x weekly - 5 sets - 30 hold  Supine Single Leg Ankle Pumps - 10 x daily - 7 x weekly - 3 sets - 10 reps  Supine Quadricep Sets - 2 x daily - 7 x weekly - 10 sets - 1 reps - 10 hold  Supine Straight Leg Raises - 2 x daily - 7 x weekly - 3 sets - 10 reps  Sidelying Hip Abduction - 2 x daily - 7 x weekly - 3 sets - 10 reps  Supine Hip Adduction Isometric with Ball - 1 x daily - 7 x weekly - 10 sets - 10 hold  Supine Heel Slide with Strap - 2 x daily - 7 x weekly - 10 sets - 10 hold        GOALS:   Patient stated goal:   Get onto the floor with her kids. Squat all the way down. Return to previous activities. [] Progressing: [] Met: [] Not Met: [] Adjusted    Therapist goals for Patient:   Short Term Goals: To be achieved in: 2 weeks  1. Independent in HEP and progression per patient tolerance, in order to prevent re-injury. [] Progressing: [] Met: [] Not Met: [] Adjusted   2. Pt will report pain at worst less than or equal to 4/10. [] Progressing: [] Met: [] Not Met: [] Adjusted  3. Pt will improve knee ROM to 90 degrees flexion. [] Progressing: [] Met: [] Not Met: [] Adjusted    Long Term Goals: To be achieved in: 6-8 weeks  1.  Pt will demo a score 65 or better for FOTO to assist with reaching prior level of function. [] Progressing: [] Met: [] Not Met: [] Adjusted  2. Patient will demonstrate increased AROM to knee ext to equal to 0 and knee flex greater than or equal to 140-150 degrees to allow for proper joint functioning as indicated by patients Functional Deficits. [] Progressing: [] Met: [] Not Met: [] Adjusted  3. Patient will demonstrate an increase in strength to hip flex, hip ABD, and knee flex and ext strength to 4+/5 or HHD within 90% of contralateral limb to allow for proper functional mobility as indicated by patients functional deficits. [] Progressing: [] Met: [] Not Met: [] Adjusted  4. Patient will return to walking with a normal gait pattern and kneeling down with her children without exacerbation of pain/discomfort in her knee. [] Progressing: [] Met: [] Not Met: [] Adjusted  5. Pt will report pain at worst less than or equal to 0/10.    [] Progressing: [] Met: [] Not Met: [] Adjusted   6. 10-12 weeks: Patient will return to light recreation activities with curling, pickleball, running,etc.   [] Progressing: [] Met: [] Not Met: [] Adjusted     Physical Therapy Evaluation Complexity Justification  [x] A history of present problem with:  [] no personal factors and/or comorbidities that impact the plan of care;  [x]1-2 personal factors and/or comorbidities that impact the plan of care  []3 personal factors and/or comorbidities that impact the plan of care  [x] An examination of body systems using standardized tests and measures addressing any of the following: body structures and functions (impairments), activity limitations, and/or participation restrictions;:  [] a total of 1-2 or more elements   [x] a total of 3 or more elements   [] a total of 4 or more elements   [x] A clinical presentation with:  [x] stable and/or uncomplicated characteristics   [] evolving clinical presentation with changing characteristics  [] unstable and unpredictable characteristics;   [x] Clinical decision making of [x] low, [] moderate, [] high complexity using standardized patient assessment instrument and/or measurable assessment of functional outcome. [x] EVAL (LOW) 24438 (typically 20 minutes face-to-face)  [] EVAL (MOD) 31029 (typically 30 minutes face-to-face)  [] EVAL (HIGH) 55704 (typically 45 minutes face-to-face)  [] [facility] [place]    Electronically signed by:    Allegra Mario, Student Physical Therapist  Therapist was present, directed the patient's care, made skilled judgement, and was responsible for assessment and treatment of the patient.       Terrance Esquivel, PT, DPT, Board-Certified Clinical Specialist in Orthopaedic Physical Therapy, [phone]

## 2022-12-09 NOTE — PROGRESS NOTES
Chema Crystal returns today to follow-up her right knee scope performed 2 days ago. She is doing well. Pain is well managed. Today, incisions look good without infection. She has a small amount drainable effusion. Calf is soft. I reviewed her arthroscopic photos with her. I changed her bandages and placed new Steri-Strips and Tegaderm. Compression was applied. She will start therapy and follow-up with me in a week.

## 2022-12-13 ENCOUNTER — HOSPITAL ENCOUNTER (OUTPATIENT)
Dept: PHYSICAL THERAPY | Age: 42
Setting detail: THERAPIES SERIES
Discharge: HOME | End: 2022-12-13
Payer: COMMERCIAL

## 2022-12-13 PROCEDURE — 97016 VASOPNEUMATIC DEVICE THERAPY: CPT | Performed by: PHYSICAL THERAPIST

## 2022-12-13 PROCEDURE — 97530 THERAPEUTIC ACTIVITIES: CPT | Performed by: PHYSICAL THERAPIST

## 2022-12-13 PROCEDURE — 97112 NEUROMUSCULAR REEDUCATION: CPT | Performed by: PHYSICAL THERAPIST

## 2022-12-13 PROCEDURE — 97116 GAIT TRAINING THERAPY: CPT | Performed by: PHYSICAL THERAPIST

## 2022-12-13 NOTE — FLOWSHEET NOTE
100 [facility] a Department of [address]  [facility] [place] [address]  Office: [phone]  Fax:  [phone]                                                      Physical Therapy Treatment Note/ Progress Report:      Date:  2022    Patient Name:  Mariluz Fisher    :  1980  MRN: [id number]  Restrictions/Precautions:    Medical/Treatment Diagnosis Information:  Diagnosis: S83.241 (ICD-10-CM) - Acute medial meniscus tear of right knee. S/p medial meniscectomy. Surgery on 22  Treatment Diagnosis: M25.561 - Pain in right knee  Insurance/Certification information:  PT Insurance Information: [facility]  Physician Information:  Referring Provider (secondary): Malik Escobedo MD  Has the plan of care been signed (Y/N):        []  Yes  [x]  No     Date of Patient follow up with Physician: 16 Dec 2022      Is this a Progress Report:     []  Yes  [x]  No        If Yes:  Date Range for reporting period:  Beginning 2022  Ending    Progress report will be due (10 Rx or 30 days whichever is less): 2023             Visit # Insurance Allowable Auth Required   2 (4 prior used) 100   []  Yes [x]  No          Functional Scale: Silvanonam   Date assessed: 2022      Latex Allergy:  [x]NO      []YES  Preferred Language for Healthcare:   [x]English       []other:      Pain level:  2/10    SUBJECTIVE:  She was able to stop taking pain meds . Her pain has not been too bad. She has been using one crutch to get around the house. Sleep has been improving. Her exercises have been going well and she feels more comfortable with bending her knee. OBJECTIVE: Dec 13 2022  Observation: Minor TTP along medial joint line.  Hypomobility of the patella  Test measurements:          Flexibility L R Comment   Hamstring      Gastroc      ITB      Quad                ROM PROM AROM Overpressure Comment    L R L R L R    Flexion  95 Extension    Hyper 2                              Strength L R Comment   Quad      Hamstring      Gastroc      Hip flexor      Hip ABD                          RESTRICTIONS/PRECAUTIONS: R Medial Meniscectomy on 12/7/2022     Exercises/Interventions:     Exercise/Equipment Resistance and Repetitions Other comments   Stretching     Hamstring 3 x 30 seconds    Hip Flexion     ITB- Rope     Grion     Quad     Inclined Calf     Towel Pull 3 x 30 seconds    Piriformis     Seated Flexion  EOB LLE support             SLR     Supine 3x10 reps Progress weight NPV   Prone 3x10 reps    Abduction 3x10 reps    Adducton 3x10 reps    SLR+          Isometrics     Quad sets 10 x 10\"    Ball Squeezes    Patellar Glides     Medial     Superior     Inferior          ROM     Passive     Active     Weight Shift     Hang Weights     Sheet Pulls 10 x 10\"    Ankle Pumps    EOB ROM Flexion EOB LLE support        CKC     Calf raises 3x10 reps BW    Wall sits     Step ups     1 leg stand     Squatting     CC TKE     Balance     Monster Walks     Bridging     Triple threats     Stool Scoots     PRE     Extension  RANGE:   Flexion  RANGE:        Cable Column          Leg Press  RANGE:        Bike 5' Level 1 Full rot. Treadmill     Gait Training Gait with 1 crutch 2 laps  Low aman stepping 2 laps 20'     Therapeutic Exercise and NMR EXR  [x] (48100) Provided verbal/tactile cueing for activities related to strengthening, flexibility, endurance, ROM for improvements in LE, proximal hip, and core control with self care, mobility, lifting, ambulation. [x] (94553) Provided verbal/tactile cueing for activities related to improving balance, coordination, kinesthetic sense, posture, motor skill, proprioception  to assist with LE, proximal hip, and core control in self care, mobility, lifting, ambulation and eccentric single leg control.      NMR and Therapeutic Activities:    [x] (19050 or 26356) Provided verbal/tactile cueing for activities related to improving balance, coordination, kinesthetic sense, posture, motor skill, proprioception and motor activation to allow for proper function of core, proximal hip and LE with self care and ADLs  [x] (62544) Gait Re-education- Provided training and instruction to the patient for proper LE, core and proximal hip recruitment and positioning and eccentric body weight control with ambulation re-education including up and down stairs     Home Exercise Program:    [x] (26402) Reviewed/Progressed HEP activities related to strengthening, flexibility, endurance, ROM of core, proximal hip and LE for functional self-care, mobility, lifting and ambulation/stair navigation   [x] (20225)Reviewed/Progressed HEP activities related to improving balance, coordination, kinesthetic sense, posture, motor skill, proprioception of core, proximal hip and LE for self care, mobility, lifting, and ambulation/stair navigation      Access Code: [email or web address]  URL: [email or web address]  Date: 12/09/2022  Prepared by: Nilsa Turkna    Exercises  Seated Table Hamstring Stretch - 2 x daily - 7 x weekly - 5 sets - 30 hold  Seated Gastroc Stretch with Strap - 2 x daily - 7 x weekly - 5 sets - 30 hold  Supine Single Leg Ankle Pumps - 10 x daily - 7 x weekly - 3 sets - 10 reps  Supine Quadricep Sets - 2 x daily - 7 x weekly - 10 sets - 1 reps - 10 hold  Supine Straight Leg Raises - 2 x daily - 7 x weekly - 3 sets - 10 reps  Sidelying Hip Abduction - 2 x daily - 7 x weekly - 3 sets - 10 reps  Supine Hip Adduction Isometric with Ball - 1 x daily - 7 x weekly - 10 sets - 10 hold  Supine Heel Slide with Strap - 2 x daily - 7 x weekly - 10 sets - 10 hold      Manual Treatments:  PROM / STM / Oscillations-Mobs:  G-I, II, III, IV (PA's, Inf., Post.)  [x] (99252) Provided manual therapy to mobilize LE, proximal hip and/or LS spine soft tissue/joints for the purpose of modulating pain, promoting relaxation,  increasing ROM, reducing/eliminating soft tissue swelling/inflammation/restriction, improving soft tissue extensibility and allowing for proper ROM for normal function with self care, mobility, lifting and ambulation. Other:  Patient education on PT and plan of care including diagnosis, prognosis, treatment goals and options. Patient agrees with discussed POC and treatment and is aware of rehab process. Pt was also educated on clinic layout and use of modalities. PT gave pt business card to call if any questions. Modalities: 13' Game Ready for swelling and pain    Charges:   Timed Code Treatment Minutes: 40'   Total Treatment Minutes: 54'     [] EVAL (LOW) 455 1011   [] EVAL (MOD) 91918   [] EVAL (HIGH) 53278   [] RE-EVAL   [] LO(12618) x     [] IONTO  [x] NMR (98339) x  1    [x] VASO  [] Manual (92115) x      [x] Other: GAIT x1  [x] TA x 1     [] Mech Traction (34449)  [] ES(attended) (17163)      [] ES (un) (12229):     GOALS:   Patient stated goal:   Get onto the floor with her kids. Squat all the way down. Return to previous activities. [] Progressing: [] Met: [] Not Met: [] Adjusted     Therapist goals for Patient:   Short Term Goals: To be achieved in: 2 weeks  1. Independent in HEP and progression per patient tolerance, in order to prevent re-injury. [] Progressing: [] Met: [] Not Met: [] Adjusted   2. Pt will report pain at worst less than or equal to 4/10. [] Progressing: [] Met: [] Not Met: [] Adjusted  3. Pt will improve knee ROM to 90 degrees flexion. [] Progressing: [] Met: [] Not Met: [] Adjusted     Long Term Goals: To be achieved in: 8 weeks  1. Pt will demo a score 65 or better for FOTO to assist with reaching prior level of function. [] Progressing: [] Met: [] Not Met: [] Adjusted  2. Patient will demonstrate increased AROM to knee ext to equal to 0 and knee flex greater than or equal to 140-150 degrees to allow for proper joint functioning as indicated by patients Functional Deficits.     [] Progressing: [] Met: [] Not Met: [] Adjusted  3. Patient will demonstrate an increase in strength to hip flex, hip ABD, and knee flex and ext strength to 4+/5 or HHD within 90% of contralateral limb to allow for proper functional mobility as indicated by patients functional deficits. [] Progressing: [] Met: [] Not Met: [] Adjusted  4. Patient will return to walking with a normal gait pattern and kneeling down with her children without exacerbation of pain/discomfort in her knee. [] Progressing: [] Met: [] Not Met: [] Adjusted  5. Pt will report pain at worst less than or equal to 0/10. [] Progressing: [] Met: [] Not Met: [] Adjusted       6. 10-12 weeks: Patient will return to light recreation activities with curling, pickleball, running,etc.   [] Progressing: [] Met: [] Not Met: [] Adjusted      Overall Progression Towards Functional goals/ Treatment Progress Update:  [] Patient is progressing as expected towards functional goals listed. [] Progression is slowed due to complexities/Impairments listed. [] Progression has been slowed due to co-morbidities. [x] Plan just implemented, too soon to assess goals progression <30days   [] Goals require adjustment due to lack of progress  [] Patient is not progressing as expected and requires additional follow up with physician  [] Other    Prognosis for POC: [x] Good [] Fair  [] Poor      Patient requires continued skilled intervention: [x] Yes  [] No    Treatment/Activity Tolerance:  [x] Patient able to complete treatment  [] Patient limited by fatigue  [] Patient limited by pain     [] Patient limited by other medical complications  [] Other: Pt david tx well. She showed improved flexion ROM, less pain, and less apprehension for movement. Pt shows good quad activation and motor control during 4 way SLR. Gait mechanics are improving and we transitioned to use of one crutch during gait.  Patient was able to perform aman stepping with adequate knee flexion and this translated well to improved comfort with normal gait. Pt was educated to continue use of 2 crutches for any community ambulation and 1 crutch for short distances and around the house. Education provided on continuing HEP, including ankle pumps and transition to calf raises to avoid any post-surgical complications. Pt will continue to benefit from skilled PT to progress ROM, strength, and functional capacity to return to PLOF. PLAN:   ROM progression, quad and hip strength, focus on normalizing gait pattern with dec crutch usage  If pt doesn't return, this note can be considered a D/C note. See eval  [x] Continue per plan of care [] Alter current plan (see comments above)  [] Plan of care initiated [] Hold pending MD visit [] Discharge    Electronically signed by:    Navneet Villar, Student Physical Therapist  Therapist was present, directed the patient's care, made skilled judgement, and was responsible for assessment and treatment of the patient.       Shabnam Salmon, PT, DPT, Board-Certified Clinical Specialist in Orthopaedic Physical Therapy [id number]

## 2022-12-16 ENCOUNTER — HOSPITAL ENCOUNTER (OUTPATIENT)
Dept: PHYSICAL THERAPY | Age: 42
Setting detail: THERAPIES SERIES
Discharge: HOME | End: 2022-12-16
Payer: COMMERCIAL

## 2022-12-16 ENCOUNTER — OFFICE VISIT (OUTPATIENT)
Dept: ORTHOPEDIC SURGERY | Age: 42
End: 2022-12-16

## 2022-12-16 VITALS — WEIGHT: 140 LBS | BODY MASS INDEX: 22.5 KG/M2 | HEIGHT: 66 IN | RESPIRATION RATE: 12 BRPM

## 2022-12-16 DIAGNOSIS — S83.241D ACUTE MEDIAL MENISCUS TEAR, RIGHT, SUBSEQUENT ENCOUNTER: Primary | ICD-10-CM

## 2022-12-16 DIAGNOSIS — M25.561 ACUTE PAIN OF RIGHT KNEE: ICD-10-CM

## 2022-12-16 PROCEDURE — 97530 THERAPEUTIC ACTIVITIES: CPT | Performed by: PHYSICAL THERAPIST

## 2022-12-16 PROCEDURE — 97016 VASOPNEUMATIC DEVICE THERAPY: CPT | Performed by: PHYSICAL THERAPIST

## 2022-12-16 PROCEDURE — 97112 NEUROMUSCULAR REEDUCATION: CPT | Performed by: PHYSICAL THERAPIST

## 2022-12-16 PROCEDURE — 99024 POSTOP FOLLOW-UP VISIT: CPT | Performed by: ORTHOPAEDIC SURGERY

## 2022-12-16 PROCEDURE — 97110 THERAPEUTIC EXERCISES: CPT | Performed by: PHYSICAL THERAPIST

## 2022-12-16 NOTE — PROGRESS NOTES
Penny Hill returns today 1 week status post right knee arthroscopy with partial medial meniscectomy. She is doing well and reports minimal discomfort. She is hoping to resume curling soon. Today, incisions look good without infection. Calf is soft without DVT. I removed her sutures and placed new Steri-Strips and Tegaderm. She will continue with rehab and follow-up with me in 3 weeks.

## 2022-12-16 NOTE — FLOWSHEET NOTE
100 [facility] a Department of [address]  Orin Miranda [place] [address], [address]  Office: [phone]  Fax:  [phone]                                                      Physical Therapy Treatment Note/ Progress Report:      Date:  2022    Patient Name:  Chema Crystal    :  1980  MRN: [id number]  Restrictions/Precautions:    Medical/Treatment Diagnosis Information:  Diagnosis: S83.241 (ICD-10-CM) - Acute medial meniscus tear of right knee. S/p medial meniscectomy. Surgery on 22  Treatment Diagnosis: M25.561 - Pain in right knee  Insurance/Certification information:  PT Insurance Information: [facility]  Physician Information:  Referring Provider (secondary): Yuri Wall MD  Has the plan of care been signed (Y/N):        []  Yes  [x]  No     Date of Patient follow up with Physician: 16 Dec 2022      Is this a Progress Report:     []  Yes  [x]  No        If Yes:  Date Range for reporting period:  Beginning 2022  Ending    Progress report will be due (10 Rx or 30 days whichever is less): 2023             Visit # Insurance Allowable Auth Required   3 (4 prior used) 100   []  Yes [x]  No          Functional Scale: Saeedmary   Date assessed: 2022      Latex Allergy:  [x]NO      []YES  Preferred Language for Healthcare:   [x]English       []other:      Pain level:  0/10    SUBJECTIVE:  Her leg has been feeling better. She started going up stairs at home with the use of a railing. Sleep has been improving and she feels improvement with her overall pain levels, which occurs when she overdoes it. She feels she is ready to be done with the crutch.      OBJECTIVE: Dec 15 2022  Observation:   Test measurements:          Flexibility L R Comment   Hamstring      Gastroc      ITB      Quad                ROM PROM AROM Overpressure Comment    L R L R L R    Flexion  115        Extension    Hyper 2 Strength L R Comment   Quad      Hamstring      Gastroc      Hip flexor      Hip ABD                          RESTRICTIONS/PRECAUTIONS: R Medial Meniscectomy on 12/7/2022     Exercises/Interventions:     Exercise/Equipment Resistance and Repetitions Other comments   Stretching     Hamstring    Hip Flexion     ITB- Rope     Grion     Quad     Inclined Calf     Towel Pull    Piriformis     Seated Flexion  EOB LLE support             SLR     Supine 3x10 reps 1#    Prone 3x10 reps 1#    Abduction 3x10 reps 1#    Adducton 3x10 reps 1#    SLR+ 3 x 20\"         Isometrics     Quad sets 10 x 10\"    Ball Squeezes    Patellar Glides     Medial     Superior     Inferior          ROM     Passive     Active     Weight Shift     Hang Weights     Sheet Pulls 10 x 10\"    Ankle Pumps    EOB ROM Flexion EOB LLE support        CKC     Calf raises 3x10 reps BW    Wall sits     Step ups     1 leg stand 4 x 30 seconds SLS    Squatting Mini squat with HHA 3x8    CC TKE     Balance     Monster Walks     Bridging     Triple threats     Stool Scoots     PRE     Extension  RANGE:   Flexion  RANGE:        Cable Column          Leg Press  RANGE:        Bike 5' Level 1 Full rot. Treadmill     Gait Training Gait with 1 crutch 2 laps  Gait with no crutch 2 laps   Therapeutic Exercise and NMR EXR  [x] (62189) Provided verbal/tactile cueing for activities related to strengthening, flexibility, endurance, ROM for improvements in LE, proximal hip, and core control with self care, mobility, lifting, ambulation. [x] (41599) Provided verbal/tactile cueing for activities related to improving balance, coordination, kinesthetic sense, posture, motor skill, proprioception  to assist with LE, proximal hip, and core control in self care, mobility, lifting, ambulation and eccentric single leg control.      NMR and Therapeutic Activities:    [x] (67089 or 12390) Provided verbal/tactile cueing for activities related to improving balance, coordination, kinesthetic sense, posture, motor skill, proprioception and motor activation to allow for proper function of core, proximal hip and LE with self care and ADLs  [x] (68809) Gait Re-education- Provided training and instruction to the patient for proper LE, core and proximal hip recruitment and positioning and eccentric body weight control with ambulation re-education including up and down stairs     Home Exercise Program:    [x] (49105) Reviewed/Progressed HEP activities related to strengthening, flexibility, endurance, ROM of core, proximal hip and LE for functional self-care, mobility, lifting and ambulation/stair navigation   [x] (13390)Reviewed/Progressed HEP activities related to improving balance, coordination, kinesthetic sense, posture, motor skill, proprioception of core, proximal hip and LE for self care, mobility, lifting, and ambulation/stair navigation      Access Code: [email or web address]  URL: [email or web address]  Date: 12/16/2022  Prepared by: Perla Jaramillo    Exercises  Seated Table Hamstring Stretch - 2 x daily - 7 x weekly - 5 sets - 30 hold  Seated Gastroc Stretch with Strap - 2 x daily - 7 x weekly - 5 sets - 30 hold  Supine Single Leg Ankle Pumps - 10 x daily - 7 x weekly - 3 sets - 10 reps  Supine Quadricep Sets - 2 x daily - 7 x weekly - 10 sets - 1 reps - 10 hold  Supine Straight Leg Raises - 2 x daily - 7 x weekly - 3 sets - 10 reps  Sidelying Hip Abduction - 2 x daily - 7 x weekly - 3 sets - 10 reps  Supine Hip Adduction Isometric with Ball - 1 x daily - 7 x weekly - 10 sets - 10 hold  Supine Heel Slide with Strap - 2 x daily - 7 x weekly - 10 sets - 10 hold  Mini Squat with Counter Support - 1 x daily - 3 x weekly - 3 sets - 10 reps  Standing Heel Raise - 1 x daily - 3 x weekly - 3 sets - 10 reps        Manual Treatments:  PROM / STM / Oscillations-Mobs:  G-I, II, III, IV (PA's, Inf., Post.)  [x] (24771) Provided manual therapy to mobilize LE, proximal hip and/or LS spine soft tissue/joints for the purpose of modulating pain, promoting relaxation,  increasing ROM, reducing/eliminating soft tissue swelling/inflammation/restriction, improving soft tissue extensibility and allowing for proper ROM for normal function with self care, mobility, lifting and ambulation. Other:  Patient education on PT and plan of care including diagnosis, prognosis, treatment goals and options. Patient agrees with discussed POC and treatment and is aware of rehab process. Pt was also educated on clinic layout and use of modalities. PT gave pt business card to call if any questions. Modalities: 13' Game Ready for swelling and pain    Charges:   Timed Code Treatment Minutes: 40'   Total Treatment Minutes: 61'     [] EVAL (LOW) 455 1011   [] EVAL (MOD) 64474   [] EVAL (HIGH) 02360   [] RE-EVAL   [x] SM(20055) x 1    [] IONTO  [x] NMR (14260) x  1    [x] VASO  [] Manual (08553) x      [] Other: GAIT x1  [x] TA x 1     [] Mech Traction (90134)  [] ES(attended) (91424)      [] ES (un) (88632):     GOALS:   Patient stated goal:   Get onto the floor with her kids. Squat all the way down. Return to previous activities. [] Progressing: [] Met: [] Not Met: [] Adjusted     Therapist goals for Patient:   Short Term Goals: To be achieved in: 2 weeks  1. Independent in HEP and progression per patient tolerance, in order to prevent re-injury. [] Progressing: [] Met: [] Not Met: [] Adjusted   2. Pt will report pain at worst less than or equal to 4/10. [] Progressing: [] Met: [] Not Met: [] Adjusted  3. Pt will improve knee ROM to 90 degrees flexion. [] Progressing: [] Met: [] Not Met: [] Adjusted     Long Term Goals: To be achieved in: 8 weeks  1. Pt will demo a score 65 or better for FOTO to assist with reaching prior level of function. [] Progressing: [] Met: [] Not Met: [] Adjusted  2.  Patient will demonstrate increased AROM to knee ext to equal to 0 and knee flex greater than or equal to 140-150 degrees to allow for proper joint functioning as indicated by patients Functional Deficits. [] Progressing: [] Met: [] Not Met: [] Adjusted  3. Patient will demonstrate an increase in strength to hip flex, hip ABD, and knee flex and ext strength to 4+/5 or HHD within 90% of contralateral limb to allow for proper functional mobility as indicated by patients functional deficits. [] Progressing: [] Met: [] Not Met: [] Adjusted  4. Patient will return to walking with a normal gait pattern and kneeling down with her children without exacerbation of pain/discomfort in her knee. [] Progressing: [] Met: [] Not Met: [] Adjusted  5. Pt will report pain at worst less than or equal to 0/10. [] Progressing: [] Met: [] Not Met: [] Adjusted       6. 10-12 weeks: Patient will return to light recreation activities with curling, pickleball, running,etc.   [] Progressing: [] Met: [] Not Met: [] Adjusted      Overall Progression Towards Functional goals/ Treatment Progress Update:  [] Patient is progressing as expected towards functional goals listed. [] Progression is slowed due to complexities/Impairments listed. [] Progression has been slowed due to co-morbidities. [x] Plan just implemented, too soon to assess goals progression <30days   [] Goals require adjustment due to lack of progress  [] Patient is not progressing as expected and requires additional follow up with physician  [] Other    Prognosis for POC: [x] Good [] Fair  [] Poor      Patient requires continued skilled intervention: [x] Yes  [] No    Treatment/Activity Tolerance:  [x] Patient able to complete treatment  [] Patient limited by fatigue  [] Patient limited by pain     [] Patient limited by other medical complications  [] Other: Pt david tx well. She continues to improve her flexion ROM and is having less discomfort with motion overall. Her gait is normalizing and she can transition towards no crutch use for short distances around the house.   She will use the crutch in at risk locations or if having pain, limping, swelling. Mini squat form looked adequate with minimal irritation. Continue to progress functional exercise and strengthening per tolerance through skilled PT to return to PLOF. PLAN:   ROM progression, quad and hip strength, focus on normalizing gait pattern with dec crutch usage  If pt doesn't return, this note can be considered a D/C note. See eval  [x] Continue per plan of care [] Alter current plan (see comments above)  [] Plan of care initiated [] Hold pending MD visit [] Discharge    Electronically signed by:    Holley Chase, Student Physical Therapist  Therapist was present, directed the patient's care, made skilled judgement, and was responsible for assessment and treatment of the patient.       Florinda Mae, PT, DPT, Board-Certified Clinical Specialist in Orthopaedic Physical Therapy [id number]

## 2022-12-21 ENCOUNTER — HOSPITAL ENCOUNTER (OUTPATIENT)
Dept: PHYSICAL THERAPY | Age: 42
Setting detail: THERAPIES SERIES
Discharge: HOME | End: 2022-12-21
Payer: COMMERCIAL

## 2022-12-21 PROCEDURE — 97530 THERAPEUTIC ACTIVITIES: CPT

## 2022-12-21 PROCEDURE — 97112 NEUROMUSCULAR REEDUCATION: CPT

## 2022-12-21 PROCEDURE — 97016 VASOPNEUMATIC DEVICE THERAPY: CPT

## 2022-12-21 PROCEDURE — 97110 THERAPEUTIC EXERCISES: CPT

## 2022-12-21 NOTE — FLOWSHEET NOTE
100 [facility] a Department of [address]  Orin Miranda [place] [address], [address]  Office: [phone]  Fax:  [phone]                                                      Physical Therapy Treatment Note/ Progress Report:      Date:  2022    Patient Name:  Obdulio Thacker    :  1980  MRN: [id number]  Restrictions/Precautions:    Medical/Treatment Diagnosis Information:  Diagnosis: S83.241 (ICD-10-CM) - Acute medial meniscus tear of right knee. S/p medial meniscectomy. Surgery on 22  Treatment Diagnosis: M25.561 - Pain in right knee  Insurance/Certification information:  PT Insurance Information: [facility]  Physician Information:  Referring Provider (secondary): Silver Bryant MD  Has the plan of care been signed (Y/N):        []  Yes  [x]  No     Date of Patient follow up with Physician: 2022      Is this a Progress Report:     []  Yes  [x]  No        If Yes:  Date Range for reporting period:  Beginning 2022  Ending    Progress report will be due (10 Rx or 30 days whichever is less): 2023             Visit # Insurance Allowable Auth Required   4 (4 prior used) 100   []  Yes [x]  No          Functional Scale: [place]   Date assessed: 2022      Latex Allergy:  [x]NO      []YES  Preferred Language for Healthcare:   [x]English       []other:      Pain level:  0/10    SUBJECTIVE:    Pt 2 weeks s/p R MM this date. Pt reports improved function and is navigating short distances well independently. States that mild soreness and swelling is noticeable around 4-5 o'clock and is managing symptoms appropriately with ice and elevation.            OBJECTIVE: Dec 15 2022  Observation:   Test measurements:          Flexibility L R Comment   Hamstring      Gastroc      ITB      Quad                ROM PROM AROM Overpressure Comment    L R L R L R    Flexion  133        Extension    Hyper 2 Strength L R Comment   Quad      Hamstring      Gastroc      Hip flexor      Hip ABD                          RESTRICTIONS/PRECAUTIONS: R Medial Meniscectomy on 12/7/2022     Exercises/Interventions:     Exercise/Equipment Resistance and Repetitions Other comments   Stretching     Hamstring    Hip Flexion     ITB- Rope     Grion     Quad     Inclined Calf Off step 3x30 seconds     Towel Pull    Piriformis     Seated Flexion  EOB LLE support             SLR     Supine 3x10 reps 1#    Prone 3x10 reps 1#    Abduction 3x10 reps 1#    Adducton 3x10 reps 1#    SLR+ 3 x 20\" At end of session         Isometrics     Quad sets 10 x 10\"    Ball Squeezes    Patellar Glides     Medial     Superior     Inferior          ROM     Passive     Active     Weight Shift     Hang Weights     Sheet Pulls 10 x 10\" D/C NPV    Ankle Pumps    EOB ROM Flexion EOB LLE support        CKC     Calf raises 3x10 reps BW    Wall sits     Step ups     1 leg stand 4 x 30 seconds SLS Add Airex NPV    Squatting Mini squat with HHA 3x8 On M/L rocker board; cues for hip ER     CC TKE     Balance     Monster Walks     Bridging Blue SB 2x10  Table top position    Triple threats     Stool Scoots     PRE     Extension  RANGE:   Flexion  RANGE:        Cable Column          Leg Press  RANGE:        Bike 5' Level 2 Full rot. Treadmill     Gait Training   Therapeutic Exercise and NMR EXR  [x] (54156) Provided verbal/tactile cueing for activities related to strengthening, flexibility, endurance, ROM for improvements in LE, proximal hip, and core control with self care, mobility, lifting, ambulation. [x] (94919) Provided verbal/tactile cueing for activities related to improving balance, coordination, kinesthetic sense, posture, motor skill, proprioception  to assist with LE, proximal hip, and core control in self care, mobility, lifting, ambulation and eccentric single leg control.      NMR and Therapeutic Activities:    [x] (72590 or 59508) Provided verbal/tactile cueing for activities related to improving balance, coordination, kinesthetic sense, posture, motor skill, proprioception and motor activation to allow for proper function of core, proximal hip and LE with self care and ADLs  [x] (45180) Gait Re-education- Provided training and instruction to the patient for proper LE, core and proximal hip recruitment and positioning and eccentric body weight control with ambulation re-education including up and down stairs     Home Exercise Program:    [x] (24926) Reviewed/Progressed HEP activities related to strengthening, flexibility, endurance, ROM of core, proximal hip and LE for functional self-care, mobility, lifting and ambulation/stair navigation   [x] (93577)Reviewed/Progressed HEP activities related to improving balance, coordination, kinesthetic sense, posture, motor skill, proprioception of core, proximal hip and LE for self care, mobility, lifting, and ambulation/stair navigation      Access Code: [email or web address]  URL: [email or web address]  Date: 12/16/2022  Prepared by: Perla Jaramillo    Exercises  Seated Table Hamstring Stretch - 2 x daily - 7 x weekly - 5 sets - 30 hold  Seated Gastroc Stretch with Strap - 2 x daily - 7 x weekly - 5 sets - 30 hold  Supine Single Leg Ankle Pumps - 10 x daily - 7 x weekly - 3 sets - 10 reps  Supine Quadricep Sets - 2 x daily - 7 x weekly - 10 sets - 1 reps - 10 hold  Supine Straight Leg Raises - 2 x daily - 7 x weekly - 3 sets - 10 reps  Sidelying Hip Abduction - 2 x daily - 7 x weekly - 3 sets - 10 reps  Supine Hip Adduction Isometric with Ball - 1 x daily - 7 x weekly - 10 sets - 10 hold  Supine Heel Slide with Strap - 2 x daily - 7 x weekly - 10 sets - 10 hold  Mini Squat with Counter Support - 1 x daily - 3 x weekly - 3 sets - 10 reps  Standing Heel Raise - 1 x daily - 3 x weekly - 3 sets - 10 reps        Manual Treatments:  PROM / STM / Oscillations-Mobs:  G-I, II, III, IV (PA's, Inf., Post.)  [] (10660) Provided manual therapy to mobilize LE, proximal hip and/or LS spine soft tissue/joints for the purpose of modulating pain, promoting relaxation,  increasing ROM, reducing/eliminating soft tissue swelling/inflammation/restriction, improving soft tissue extensibility and allowing for proper ROM for normal function with self care, mobility, lifting and ambulation. Other:  Patient education on PT and plan of care including diagnosis, prognosis, treatment goals and options. Patient agrees with discussed POC and treatment and is aware of rehab process. Pt was also educated on clinic layout and use of modalities. PT gave pt business card to call if any questions. Modalities: 13' Game Ready for swelling and pain    Charges:   Timed Code Treatment Minutes: 40'   Total Treatment Minutes: 61'     [] EVAL (LOW) 455 1011   [] EVAL (MOD) 44884   [] EVAL (HIGH) 72896   [] RE-EVAL   [x] WX(21107) x 1    [] IONTO  [x] NMR (56992) x  1    [x] VASO  [] Manual (46613) x      [] Other: GAIT x1  [x] TA x 1     [] Mech Traction (35705)  [] ES(attended) (07838)      [] ES (un) (50960):     GOALS:   Patient stated goal:   Get onto the floor with her kids. Squat all the way down. Return to previous activities. [] Progressing: [] Met: [] Not Met: [] Adjusted     Therapist goals for Patient:   Short Term Goals: To be achieved in: 2 weeks  1. Independent in HEP and progression per patient tolerance, in order to prevent re-injury. [] Progressing: [] Met: [] Not Met: [] Adjusted   2. Pt will report pain at worst less than or equal to 4/10. [] Progressing: [] Met: [] Not Met: [] Adjusted  3. Pt will improve knee ROM to 90 degrees flexion. [] Progressing: [] Met: [] Not Met: [] Adjusted     Long Term Goals: To be achieved in: 8 weeks  1. Pt will demo a score 65 or better for FOTO to assist with reaching prior level of function. [] Progressing: [] Met: [] Not Met: [] Adjusted  2.  Patient will demonstrate increased AROM to knee ext to equal to 0 and knee flex greater than or equal to 140-150 degrees to allow for proper joint functioning as indicated by patients Functional Deficits. [] Progressing: [] Met: [] Not Met: [] Adjusted  3. Patient will demonstrate an increase in strength to hip flex, hip ABD, and knee flex and ext strength to 4+/5 or HHD within 90% of contralateral limb to allow for proper functional mobility as indicated by patients functional deficits. [] Progressing: [] Met: [] Not Met: [] Adjusted  4. Patient will return to walking with a normal gait pattern and kneeling down with her children without exacerbation of pain/discomfort in her knee. [] Progressing: [] Met: [] Not Met: [] Adjusted  5. Pt will report pain at worst less than or equal to 0/10. [] Progressing: [] Met: [] Not Met: [] Adjusted       6. 10-12 weeks: Patient will return to light recreation activities with curling, pickleball, running,etc.   [] Progressing: [] Met: [] Not Met: [] Adjusted      Overall Progression Towards Functional goals/ Treatment Progress Update:  [] Patient is progressing as expected towards functional goals listed. [] Progression is slowed due to complexities/Impairments listed. [] Progression has been slowed due to co-morbidities. [x] Plan just implemented, too soon to assess goals progression <30days   [] Goals require adjustment due to lack of progress  [] Patient is not progressing as expected and requires additional follow up with physician  [] Other    Prognosis for POC: [x] Good [] Fair  [] Poor      Patient requires continued skilled intervention: [x] Yes  [] No    Treatment/Activity Tolerance:  [x] Patient able to complete treatment  [] Patient limited by fatigue  [] Patient limited by pain     [] Patient limited by other medical complications  [] Other: Pt david tx well. Fatigue was evident at end of session with good challenge displayed for mini squats with RB.  Mild VC's provided to improve R LE ER during eccentric squatting to avoid valgus stress to R knee and pt was able to maintain good mechanics for remainder of activity. Pt reports mild ant knee soreness at end of session and VASO intervention was utilized to decrease symptoms and improve outcomes post operatively. Pt advised to remove tegaderm on Friday if incisions are healing well. Continue to progress functional exercise and strengthening per tolerance through skilled PT to return to PLOF. PLAN:   ROM progression, quad and hip strength, focus on normalizing gait pattern with dec crutch usage  If pt doesn't return, this note can be considered a D/C note.   See eval  [x] Continue per plan of care [] Alter current plan (see comments above)  [] Plan of care initiated [] Hold pending MD visit [] Discharge    Electronically signed by:      Jarad Rosaels PTA, [id number]

## 2022-12-23 ENCOUNTER — APPOINTMENT (OUTPATIENT)
Dept: PHYSICAL THERAPY | Age: 42
End: 2022-12-23
Payer: COMMERCIAL

## 2023-01-03 ENCOUNTER — HOSPITAL ENCOUNTER (OUTPATIENT)
Dept: PHYSICAL THERAPY | Age: 43
Setting detail: THERAPIES SERIES
Discharge: HOME | End: 2023-01-03
Payer: COMMERCIAL

## 2023-01-03 ENCOUNTER — APPOINTMENT (OUTPATIENT)
Dept: PHYSICAL THERAPY | Age: 43
End: 2023-01-03
Payer: COMMERCIAL

## 2023-01-03 ENCOUNTER — OFFICE VISIT (OUTPATIENT)
Dept: ORTHOPEDIC SURGERY | Age: 43
End: 2023-01-03

## 2023-01-03 VITALS — WEIGHT: 140 LBS | HEIGHT: 66 IN | BODY MASS INDEX: 22.5 KG/M2

## 2023-01-03 DIAGNOSIS — S83.241D ACUTE MEDIAL MENISCUS TEAR, RIGHT, SUBSEQUENT ENCOUNTER: Primary | ICD-10-CM

## 2023-01-03 DIAGNOSIS — M25.561 ACUTE PAIN OF RIGHT KNEE: ICD-10-CM

## 2023-01-03 PROCEDURE — 97110 THERAPEUTIC EXERCISES: CPT | Performed by: PHYSICAL THERAPIST

## 2023-01-03 PROCEDURE — 97530 THERAPEUTIC ACTIVITIES: CPT | Performed by: PHYSICAL THERAPIST

## 2023-01-03 PROCEDURE — 99024 POSTOP FOLLOW-UP VISIT: CPT | Performed by: ORTHOPAEDIC SURGERY

## 2023-01-03 PROCEDURE — 97112 NEUROMUSCULAR REEDUCATION: CPT | Performed by: PHYSICAL THERAPIST

## 2023-01-03 NOTE — FLOWSHEET NOTE
100 [facility] a Department of [address], [facility] [place]  Office: [phone]  Fax:  [phone]                                                      Physical Therapy Treatment Note/ Progress Report:      Date:  1/3/2023    Patient Name:  Mai Martin    :  1980  MRN: [id number]  Restrictions/Precautions:    Medical/Treatment Diagnosis Information:  Diagnosis: S83.241 (ICD-10-CM) - Acute medial meniscus tear of right knee. S/p medial meniscectomy. DOS 22  Treatment Diagnosis: M25.561 - Pain in right knee  Insurance/Certification information:  PT Insurance Information: [facility]  Physician Information:  Referring Provider (secondary): Nelia Boudreaux MD  Has the plan of care been signed (Y/N):        []  Yes  [x]  No     Date of Patient follow up with Physician: 2022- today      Is this a Progress Report:     []  Yes  [x]  No        If Yes:  Date Range for reporting period:  Beginning 2022  Ending    Progress report will be due (10 Rx or 30 days whichever is less): 2023             Visit # Insurance Allowable Auth Required   5 (4 prior used) 100   []  Yes [x]  No          [facility]   Date assessed: 2022      Latex Allergy:  [x]NO      []YES  Preferred Language for Healthcare:   [x]English       []other:      Pain level:  0/10    SUBJECTIVE:  3+ wk s/p surg  Pt had MD appt today that went well. She was released back to curling in 1-2 weeks. She reports min to no pain in knee and swelling continues to resolve. Pt has been walking some, just to get more active. OBJECTIVE:   Observation: min edema in R knee region; quad contraction good;   Test measurements:          Flexibility L R Comment   Hamstring      Gastroc      ITB      Quad                ROM PROM AROM Overpressure Comment    L R L R L R    Flexion  133        Extension    Hyper 2 Strength L R Comment   Quad      Hamstring      Gastroc      Hip flexor      Hip ABD                          RESTRICTIONS/PRECAUTIONS: R Medial Meniscectomy on 12/7/2022     Exercises/Interventions:     Exercise/Equipment Resistance and Repetitions Other comments   Stretching     Hamstring 3 x 30 seconds    Hip Flexion     ITB- Rope     Grion     Quad     Inclined Calf Off step 3x30 seconds     Towel Pull    Piriformis     Seated Flexion  EOB LLE support             SLR     Supine 3x10 reps 2#    Prone 3x10 reps 2#    Abduction 3x10 reps 2#    clams Green TB 3x10    Adducton 3x10 reps 2#    SLR+ 3 x 30\" + 5 pumps At end of session         Isometrics     Quad sets 10 x 10\"    Ball Squeezes    Patellar Glides     Medial     Superior     Inferior          ROM     Passive     Active     Weight Shift     Hang Weights     Sheet Pulls D/C    Ankle Pumps    EOB ROM Flexion EOB LLE support        CKC     Calf raises 3x10 5# in UE    Wall sits 30\"x3    Step ups     1 leg stand 3 x 30 seconds SLS  Airex    Squatting Mini squat with HHA 3x8 On M/L rocker board; cues for hip ER     CC TKE     Balance     Monster Walks     Bridging Blue SB 5\" x15    Triple threats     Stool Scoots               PRE     Extension 25# 2x10 RANGE:   Flexion 25# B conc R ecc RANGE:        Cable Column          Leg Press  RANGE:        Bike 5' Level 2 Full rot. Treadmill     Gait Training   Therapeutic Exercise and NMR EXR  [x] (27671) Provided verbal/tactile cueing for activities related to strengthening, flexibility, endurance, ROM for improvements in LE, proximal hip, and core control with self care, mobility, lifting, ambulation. [x] (11466) Provided verbal/tactile cueing for activities related to improving balance, coordination, kinesthetic sense, posture, motor skill, proprioception  to assist with LE, proximal hip, and core control in self care, mobility, lifting, ambulation and eccentric single leg control.      NMR and Therapeutic Activities:    [x] (15407 or 68901) Provided verbal/tactile cueing for activities related to improving balance, coordination, kinesthetic sense, posture, motor skill, proprioception and motor activation to allow for proper function of core, proximal hip and LE with self care and ADLs  [x] (28985) Gait Re-education- Provided training and instruction to the patient for proper LE, core and proximal hip recruitment and positioning and eccentric body weight control with ambulation re-education including up and down stairs     Home Exercise Program:    [x] (48313) Reviewed/Progressed HEP activities related to strengthening, flexibility, endurance, ROM of core, proximal hip and LE for functional self-care, mobility, lifting and ambulation/stair navigation   [x] (74037)Reviewed/Progressed HEP activities related to improving balance, coordination, kinesthetic sense, posture, motor skill, proprioception of core, proximal hip and LE for self care, mobility, lifting, and ambulation/stair navigation      Access Code: [email or web address]  URL: [email or web address]  Date: 12/16/2022  Prepared by: Ritu Jaramillo    Exercises  Seated Table Hamstring Stretch - 2 x daily - 7 x weekly - 5 sets - 30 hold  Seated Gastroc Stretch with Strap - 2 x daily - 7 x weekly - 5 sets - 30 hold  Supine Single Leg Ankle Pumps - 10 x daily - 7 x weekly - 3 sets - 10 reps  Supine Quadricep Sets - 2 x daily - 7 x weekly - 10 sets - 1 reps - 10 hold  Supine Straight Leg Raises - 2 x daily - 7 x weekly - 3 sets - 10 reps  Sidelying Hip Abduction - 2 x daily - 7 x weekly - 3 sets - 10 reps  Supine Hip Adduction Isometric with Ball - 1 x daily - 7 x weekly - 10 sets - 10 hold  Supine Heel Slide with Strap - 2 x daily - 7 x weekly - 10 sets - 10 hold  Mini Squat with Counter Support - 1 x daily - 3 x weekly - 3 sets - 10 reps  Standing Heel Raise - 1 x daily - 3 x weekly - 3 sets - 10 reps        Manual Treatments:  PROM / STM / Oscillations-Mobs:  G-I, II, III, IV (PA's, Inf., Post.)  [] (40978) Provided manual therapy to mobilize LE, proximal hip and/or LS spine soft tissue/joints for the purpose of modulating pain, promoting relaxation,  increasing ROM, reducing/eliminating soft tissue swelling/inflammation/restriction, improving soft tissue extensibility and allowing for proper ROM for normal function with self care, mobility, lifting and ambulation.     Other:  Patient education on PT and plan of care including diagnosis, prognosis, treatment goals and options. Patient agrees with discussed POC and treatment and is aware of rehab process.  Pt was also educated on clinic layout and use of modalities.  PT gave pt business card to call if any questions.        Modalities: 15' Game Ready for swelling and pain    Charges:   Timed Code Treatment Minutes: 40'   Total Treatment Minutes: 60'     [] EVAL (LOW) 20250   [] EVAL (MOD) 26806   [] EVAL (HIGH) 21831   [] RE-EVAL   [x] TE(64692) x 1    [] IONTO  [x] NMR (23308) x  1    [x] VASO  [] Manual (38974) x      [] Other: GAIT x1  [x] TA x 1     [] Mech Traction (75659)  [] ES(attended) (77920)      [] ES (un) (38802):     GOALS:   Patient stated goal:   Get onto the floor with her kids. Squat all the way down. Return to previous activities.    [] Progressing: [] Met: [] Not Met: [] Adjusted     Therapist goals for Patient:   Short Term Goals: To be achieved in: 2 weeks  1. Independent in HEP and progression per patient tolerance, in order to prevent re-injury.     [] Progressing: [] Met: [] Not Met: [] Adjusted   2. Pt will report pain at worst less than or equal to 4/10.  [] Progressing: [] Met: [] Not Met: [] Adjusted  3. Pt will improve knee ROM to 90 degrees flexion.  [] Progressing: [] Met: [] Not Met: [] Adjusted     Long Term Goals: To be achieved in: 8 weeks  1. Pt will demo a score 65 or better for FOTO to assist with reaching prior level of function.   [] Progressing: [] Met: [] Not  Met: [] Adjusted  2. Patient will demonstrate increased AROM to knee ext to equal to 0 and knee flex greater than or equal to 140-150 degrees to allow for proper joint functioning as indicated by patients Functional Deficits. [] Progressing: [] Met: [] Not Met: [] Adjusted  3. Patient will demonstrate an increase in strength to hip flex, hip ABD, and knee flex and ext strength to 4+/5 or HHD within 90% of contralateral limb to allow for proper functional mobility as indicated by patients functional deficits. [] Progressing: [] Met: [] Not Met: [] Adjusted  4. Patient will return to walking with a normal gait pattern and kneeling down with her children without exacerbation of pain/discomfort in her knee. [] Progressing: [] Met: [] Not Met: [] Adjusted  5. Pt will report pain at worst less than or equal to 0/10. [] Progressing: [] Met: [] Not Met: [] Adjusted       6. 10-12 weeks: Patient will return to light recreation activities with curling, pickleball, running,etc.   [] Progressing: [] Met: [] Not Met: [] Adjusted      Overall Progression Towards Functional goals/ Treatment Progress Update:  [] Patient is progressing as expected towards functional goals listed. [] Progression is slowed due to complexities/Impairments listed. [] Progression has been slowed due to co-morbidities. [x] Plan just implemented, too soon to assess goals progression <30days   [] Goals require adjustment due to lack of progress  [] Patient is not progressing as expected and requires additional follow up with physician  [] Other    Prognosis for POC: [x] Good [] Fair  [] Poor      Patient requires continued skilled intervention: [x] Yes  [] No    Treatment/Activity Tolerance:  [x] Patient able to complete treatment  [] Patient limited by fatigue  [] Patient limited by pain     [] Patient limited by other medical complications  [] Other: ROM in knee progressing easier as pain and swelling resolves.  Strength progressing w/ fatigue noted today but good tech w/ program. Pt practiced curling position w/ excessive IR and rolling of ankle inward to create the tech. Balance statically and dynamic progressing. PLAN: Add slide board next session for curling tech. Progress pt for strengthening to return to sport.     [x] Continue per plan of care [] Alter current plan (see comments above)  [] Plan of care initiated [] Hold pending MD visit [] Discharge    Electronically signed by:  Tomer Mauricio PT, MS, OMT-C    Physical Therapist [place] license #[id number]  Physical Therapist [place] license #[id number]

## 2023-01-03 NOTE — PROGRESS NOTES
Karyna Hummel returns today a month status post right knee arthroscopy with partial medial meniscectomy. She is doing really well. She went for a long walk yesterday without difficulty. She is pleased and reports 0 out of 10 pain. A, incisions are nicely healed. Calf is soft. She has full motion. She has no effusion. I cannot provoke discomfort. At this point she will be advanced as tolerated with her therapist and believe she will be ready to resume curling in about 2 weeks.   Follow-up with me on an as-needed basis

## 2023-01-05 ENCOUNTER — HOSPITAL ENCOUNTER (OUTPATIENT)
Dept: PHYSICAL THERAPY | Age: 43
Setting detail: THERAPIES SERIES
Discharge: HOME | End: 2023-01-05
Payer: COMMERCIAL

## 2023-01-05 PROCEDURE — 97110 THERAPEUTIC EXERCISES: CPT

## 2023-01-05 PROCEDURE — 97530 THERAPEUTIC ACTIVITIES: CPT

## 2023-01-05 NOTE — FLOWSHEET NOTE
[facility] a Department of [address]  Office: [phone]  Fax:  [phone]                                                      Physical Therapy Treatment Note/ Progress Report:      Date:  2023    Patient Name:  Crow Negrete    :  1980  MRN: [id number]  Restrictions/Precautions:    Medical/Treatment Diagnosis Information:  Diagnosis: S83.241 (ICD-10-CM) - Acute medial meniscus tear of right knee. S/p medial meniscectomy. DOS 22  Treatment Diagnosis: M25.561 - Pain in right knee  Insurance/Certification information:  PT Insurance Information: [facility]  Physician Information:  Referring Provider (secondary): Delano Ramirez MD  Has the plan of care been signed (Y/N):        []  Yes  [x]  No     Date of Patient follow up with Physician: 2022- today      Is this a Progress Report:     []  Yes  [x]  No        If Yes:  Date Range for reporting period:  Beginning 2022  Ending    Progress report will be due (10 Rx or 30 days whichever is less): 2023             Visit # Insurance Allowable Auth Required   6 (4 prior used) 100   []  Yes [x]  No          Functional Scale: [place]   Date assessed: 2022      Latex Allergy:  [x]NO      []YES  Preferred Language for Healthcare:   [x]English       []other:      Pain level:  0/10    SUBJECTIVE:  4 weeks yesterday   Pt reports good response to previous session with quad fatigue and \"soreness\" expressed the day following tx. Symptoms resolved appropriately and pt currently reports 0/10 pain in R knee. OBJECTIVE:   Observation: min edema in R knee region; quad contraction good;   Test measurements:          Flexibility L R Comment   Hamstring      Gastroc      ITB      Quad                ROM PROM AROM Overpressure Comment    L R L R L R    Flexion  133        Extension    Hyper 2 Strength L R Comment   Quad      Hamstring      Gastroc      Hip flexor      Hip ABD                          RESTRICTIONS/PRECAUTIONS: R Medial Meniscectomy on 12/7/2022     Exercises/Interventions:     Exercise/Equipment Resistance and Repetitions Other comments   Stretching     Hamstring 3 x 30 seconds    Hip Flexion     ITB- Rope     Grion     Quad     Inclined Calf Off step 3x30 seconds     Towel Pull    Piriformis     Seated Flexion  EOB LLE support             SLR     Supine 3x12-15 reps 2#    Prone 3x12-15reps 2#    Abduction 3x12-15 eps 2#    clams    Adducton 3x10 reps 2#    SLR+ At end of session         Isometrics     Quad sets 10 x 10\"    Ball Squeezes    Patellar Glides     Medial     Superior     Inferior          ROM     Passive     Active     Weight Shift     Hang Weights     Sheet Pulls D/C    Ankle Pumps    EOB ROM Flexion EOB LLE support        CKC     Calf raises 3x12 5# in UE    Wall sits    Step ups     1 leg stand Slider lunge arc-front to back   2x10 R/L   Squatting On M/L rocker board; cues for hip ER     CC TKE     Balance     Monster Walks     Bridging SL 3x8 R/L     Triple threats     Stool Scoots               PRE     Extension 25# 3x10 RANGE:   Flexion 25# B conc R ecc 3x10 RANGE:        Cable Column          Leg Press  RANGE:        Bike 5' Level 3 Full rot. Treadmill     Gait Training Slider Board Curling simulation  L leg slider anterior, R leg to push off block, 18kg KB representing stone  12'  Pt began getting into starting position in catcher's squat, progressed to light push offs and increased intensity. Finished with 18kg stone        Therapeutic Exercise and NMR EXR  [x] (51290) Provided verbal/tactile cueing for activities related to strengthening, flexibility, endurance, ROM for improvements in LE, proximal hip, and core control with self care, mobility, lifting, ambulation.   [x] (83592) Provided verbal/tactile cueing for activities related to improving balance, coordination, kinesthetic sense, posture, motor skill, proprioception  to assist with LE, proximal hip, and core control in self care, mobility, lifting, ambulation and eccentric single leg control. NMR and Therapeutic Activities:    [x] (72710 or 90099) Provided verbal/tactile cueing for activities related to improving balance, coordination, kinesthetic sense, posture, motor skill, proprioception and motor activation to allow for proper function of core, proximal hip and LE with self care and ADLs  [x] (31226) Gait Re-education- Provided training and instruction to the patient for proper LE, core and proximal hip recruitment and positioning and eccentric body weight control with ambulation re-education including up and down stairs     Home Exercise Program:    [x] (47639) Reviewed/Progressed HEP activities related to strengthening, flexibility, endurance, ROM of core, proximal hip and LE for functional self-care, mobility, lifting and ambulation/stair navigation   [x] (32736)Reviewed/Progressed HEP activities related to improving balance, coordination, kinesthetic sense, posture, motor skill, proprioception of core, proximal hip and LE for self care, mobility, lifting, and ambulation/stair navigation      Access Code: [email or web address]  URL: [email or web address]  Date: 12/16/2022  Prepared by: Linda Jaramillo    Exercises  Seated Table Hamstring Stretch - 2 x daily - 7 x weekly - 5 sets - 30 hold  Seated Gastroc Stretch with Strap - 2 x daily - 7 x weekly - 5 sets - 30 hold  Supine Single Leg Ankle Pumps - 10 x daily - 7 x weekly - 3 sets - 10 reps  Supine Quadricep Sets - 2 x daily - 7 x weekly - 10 sets - 1 reps - 10 hold  Supine Straight Leg Raises - 2 x daily - 7 x weekly - 3 sets - 10 reps  Sidelying Hip Abduction - 2 x daily - 7 x weekly - 3 sets - 10 reps  Supine Hip Adduction Isometric with Ball - 1 x daily - 7 x weekly - 10 sets - 10 hold  Supine Heel Slide with Strap - 2 x daily - 7 x weekly - 10 sets - 10 hold  Mini Squat with Counter Support - 1 x daily - 3 x weekly - 3 sets - 10 reps  Standing Heel Raise - 1 x daily - 3 x weekly - 3 sets - 10 reps        Manual Treatments:  PROM / STM / Oscillations-Mobs:  G-I, II, III, IV (PA's, Inf., Post.)  [] (03601) Provided manual therapy to mobilize LE, proximal hip and/or LS spine soft tissue/joints for the purpose of modulating pain, promoting relaxation,  increasing ROM, reducing/eliminating soft tissue swelling/inflammation/restriction, improving soft tissue extensibility and allowing for proper ROM for normal function with self care, mobility, lifting and ambulation. Other:  Patient education on PT and plan of care including diagnosis, prognosis, treatment goals and options. Patient agrees with discussed POC and treatment and is aware of rehab process. Pt was also educated on clinic layout and use of modalities. PT gave pt business card to call if any questions. Modalities: ICE TO GO    Charges:   Timed Code Treatment Minutes: 52'   Total Treatment Minutes: 46'     [] EVAL (LOW) 31472   [] EVAL (MOD) 76806   [] EVAL (HIGH) 53293   [] RE-EVAL   [x] OL(48683) x 2    [] IONTO  [] NMR (93356) x  1    [] VASO  [] Manual (20552) x      [] Other: GAIT x1  [x] TA x 1     [] Mech Traction (72553)  [] ES(attended) (10155)      [] ES (un) (20808):     GOALS:   Patient stated goal:   Get onto the floor with her kids. Squat all the way down. Return to previous activities. [] Progressing: [] Met: [] Not Met: [] Adjusted     Therapist goals for Patient:   Short Term Goals: To be achieved in: 2 weeks  1. Independent in HEP and progression per patient tolerance, in order to prevent re-injury. [] Progressing: [] Met: [] Not Met: [] Adjusted   2. Pt will report pain at worst less than or equal to 4/10. [] Progressing: [] Met: [] Not Met: [] Adjusted  3. Pt will improve knee ROM to 90 degrees flexion.   [] Progressing: [] Met: [] Not Met: [] Adjusted     Long Term Goals: To be achieved in: 8 weeks  1. Pt will demo a score 65 or better for FOTO to assist with reaching prior level of function. [] Progressing: [] Met: [] Not Met: [] Adjusted  2. Patient will demonstrate increased AROM to knee ext to equal to 0 and knee flex greater than or equal to 140-150 degrees to allow for proper joint functioning as indicated by patients Functional Deficits. [] Progressing: [] Met: [] Not Met: [] Adjusted  3. Patient will demonstrate an increase in strength to hip flex, hip ABD, and knee flex and ext strength to 4+/5 or HHD within 90% of contralateral limb to allow for proper functional mobility as indicated by patients functional deficits. [] Progressing: [] Met: [] Not Met: [] Adjusted  4. Patient will return to walking with a normal gait pattern and kneeling down with her children without exacerbation of pain/discomfort in her knee. [] Progressing: [] Met: [] Not Met: [] Adjusted  5. Pt will report pain at worst less than or equal to 0/10. [] Progressing: [] Met: [] Not Met: [] Adjusted       6. 10-12 weeks: Patient will return to light recreation activities with curling, pickleball, running,etc.   [] Progressing: [] Met: [] Not Met: [] Adjusted      Overall Progression Towards Functional goals/ Treatment Progress Update:  [] Patient is progressing as expected towards functional goals listed. [] Progression is slowed due to complexities/Impairments listed. [] Progression has been slowed due to co-morbidities.   [x] Plan just implemented, too soon to assess goals progression <30days   [] Goals require adjustment due to lack of progress  [] Patient is not progressing as expected and requires additional follow up with physician  [] Other    Prognosis for POC: [x] Good [] Fair  [] Poor      Patient requires continued skilled intervention: [x] Yes  [] No    Treatment/Activity Tolerance:  [x] Patient able to complete treatment  [] Patient limited by fatigue  [] Patient limited by pain     [] Patient limited by other medical complications  [] Other: Following MAT strengthening, pt performed slide board activity to replicate mechanics and postures needed for curling sport. Pt did bring in broom that they use for contralateral stability. Pt reports R patellar \"tightness\" at deep knee flexion, however, symptoms were not limiting and pt able to complete activity without increased pain. 18kg KB was used to replicate 18# stone that they use in sport and pt tolerated activity very well and was pleased with performance. Pt will plan to continue practice at [facility] with supplemental strengthening to fully progress pt towards LTG's and PLOF. PLAN: Add slide board next session for curling tech. Progress pt for strengthening to return to sport.     [x] Continue per plan of care [] Alter current plan (see comments above)  [] Plan of care initiated [] Hold pending MD visit [] Discharge    Electronically signed by:  Jeremy Roa, [facility]

## 2023-01-10 ENCOUNTER — HOSPITAL ENCOUNTER (OUTPATIENT)
Dept: PHYSICAL THERAPY | Age: 43
Setting detail: THERAPIES SERIES
Discharge: HOME | End: 2023-01-10
Payer: COMMERCIAL

## 2023-01-10 PROCEDURE — 97530 THERAPEUTIC ACTIVITIES: CPT | Performed by: PHYSICAL THERAPIST

## 2023-01-10 PROCEDURE — 97110 THERAPEUTIC EXERCISES: CPT | Performed by: PHYSICAL THERAPIST

## 2023-01-10 PROCEDURE — 97112 NEUROMUSCULAR REEDUCATION: CPT | Performed by: PHYSICAL THERAPIST

## 2023-01-10 NOTE — PLAN OF CARE
100 [facility] a Department of [address]  Orin Miranda [place] [address], [address]  Office: [phone]  Fax:  [phone]   Physical Therapy Re-Certification Plan of Care    Dear Dr. Dietrich,    We had the pleasure of treating the following patient for physical therapy services at [address] [place] [address]. A summary of our findings can be found in the updated assessment below. This includes our plan of care. If you have any questions or concerns regarding these findings, please do not hesitate to contact me at the office phone number checked above. Thank you for the referral.     Physician Signature:________________________________Date:__________________  By signing above (or electronic signature), therapists plan is approved by physician    Date Range Of Visits: 12/9/22-1/10/2023  Total Visits to Date: 7  Overall Response to Treatment:   [] Patient is responding well to treatment and improvement is noted with regards to goals   [] Patient should continue to improve in reasonable time if they continue HEP   [] Patient has plateaued and is no longer responding to skilled PT intervention    [] Patient is getting worse and would benefit from return to referring MD   [] Patient unable to adhere to initial POC   [x] Other: Pt david tx well. She was able to replicate curling push off in clinic today without problems. It was noted that she did demo right knee valgus, which was addressed. This could be due to lateral hip weakness, which we focused on in our tx. I've also given her some exercises to work on for her HEP with this. She reported being able to feel that her knee was not normal when running on the AlterG at 70%, but she did not have pain. We will continue formal PT to ensure she is safe to return to curling, and we will work on returning to running as well. Pt is understanding and agreeable to this plan. Recommend continuing tx 1x/wk x 4-6 wks. Physical Therapy Treatment Note/ Progress Report:      Date:  1/10/2023    Patient Name:  Harshil Gifford    :  1980  MRN: [id number]  Restrictions/Precautions:    Medical/Treatment Diagnosis Information:  Diagnosis: S83.241 (ICD-10-CM) - Acute medial meniscus tear of right knee. S/p medial meniscectomy. DOS 22  Treatment Diagnosis: M25.561 - Pain in right knee  Insurance/Certification information:  PT Insurance Information: [facility]  Physician Information:  Referring Provider (secondary): Aliza Blake MD  Has the plan of care been signed (Y/N):        []  Yes  [x]  No     Date of Patient follow up with Physician: prn      Is this a Progress Report:     []  Yes  [x]  No        If Yes:  Date Range for reporting period:  Beginning 2022  Ending 10 Praful 2023    Progress report will be due (10 Rx or 30 days whichever is less): 2023             Visit # Insurance Allowable Auth Required   7 (4 prior used)- 3 in  100   []  Yes [x]  No          Functional Scale: Connor Parnell   Date assessed: 1/10/23      Latex Allergy:  [x]NO      []YES  Preferred Language for Healthcare:   [x]English       []other:      Pain level:  0/10    SUBJECTIVE:  4 weeks   She had no c/o soreness after last visit. She will be getting on the ice Thursday to do some sliding. She plans on playing in a game on Friday if all goes well. She has not also returned to running. She would normally be running 3 miles 2x/wk.         OBJECTIVE: 10 Praful 2023   Observation:   Test measurements:          Flexibility L R Comment   Hamstring      Gastroc      ITB      Quad                ROM PROM AROM Overpressure Comment    L R L R L R    Flexion    150      Extension    Hyper 3                              Strength L R Comment   Quad  4+    Hamstring  4+    Gastroc      Hip flexor  4-    Hip ABD  4- RESTRICTIONS/PRECAUTIONS: R Medial Meniscectomy on 12/7/2022     Exercises/Interventions:     Exercise/Equipment Resistance and Repetitions Other comments   Stretching     Hamstring 3 x 30 seconds    Hip Flexion     ITB- Rope     Grion     Quad 3x:30 rope   Inclined Calf 3x30 seconds     Towel Pull    Piriformis     Seated Flexion  EOB LLE support             SLR     Supine    Prone    Abduction    clams    Adducton 3x10 reps 2#    SLR+ 3 x 30\" + 10 pumps  ABD- :30 hold, 30x pumps, ABC. Second rep of :30 hold At end of session         Isometrics     Quad sets    Ball Squeezes    Patellar Glides     Medial     Superior     Inferior          ROM     Passive     Active     Weight Shift     Hang Weights     Sheet Pulls     Ankle Pumps     EOB ROM Flexion  EOB LLE support        CKC     Calf raises    Wall sits circuit 3x with band walks with red band  :30 wall sit  1 lap pillar to pillar band walk    Step ups     1 leg stand  Squatting On M/L rocker board; cues for hip ER     CC TKE     Balance     Monster Walks     Bridging    Triple threats     Stool Scoots     Alt lunges x10         PRE     Extension RANGE:   Flexion RANGE:        Cable Column          Leg Press  RANGE:        Alter G 2' walk at 70%  1' run/1' walk at 70% x4  :30 walk at 80%  :30 walk at 90%  :30 walk at 100% Medium shorts   Bike 5' Level 5    Treadmill     Gait Training     Slider Board Curling simulation  L leg slider anterior, R leg to push off block, 18kg KB representing stone  10 reps        Therapeutic Exercise and NMR EXR  [x] (65276) Provided verbal/tactile cueing for activities related to strengthening, flexibility, endurance, ROM for improvements in LE, proximal hip, and core control with self care, mobility, lifting, ambulation.   [x] (08451) Provided verbal/tactile cueing for activities related to improving balance, coordination, kinesthetic sense, posture, motor skill, proprioception  to assist with LE, proximal hip, and core control in self care, mobility, lifting, ambulation and eccentric single leg control. NMR and Therapeutic Activities:    [x] (66970 or 88663) Provided verbal/tactile cueing for activities related to improving balance, coordination, kinesthetic sense, posture, motor skill, proprioception and motor activation to allow for proper function of core, proximal hip and LE with self care and ADLs  [x] (00451) Gait Re-education- Provided training and instruction to the patient for proper LE, core and proximal hip recruitment and positioning and eccentric body weight control with ambulation re-education including up and down stairs     Home Exercise Program:    [x] (90501) Reviewed/Progressed HEP activities related to strengthening, flexibility, endurance, ROM of core, proximal hip and LE for functional self-care, mobility, lifting and ambulation/stair navigation   [x] (38814)Reviewed/Progressed HEP activities related to improving balance, coordination, kinesthetic sense, posture, motor skill, proprioception of core, proximal hip and LE for self care, mobility, lifting, and ambulation/stair navigation      Access Code: [email or web address]  URL: [email or web address]  Date: 12/16/2022  Prepared by: Anjum Jaramillo    Exercises  Seated Table Hamstring Stretch - 2 x daily - 7 x weekly - 5 sets - 30 hold  Seated Gastroc Stretch with Strap - 2 x daily - 7 x weekly - 5 sets - 30 hold  Supine Single Leg Ankle Pumps - 10 x daily - 7 x weekly - 3 sets - 10 reps  Supine Quadricep Sets - 2 x daily - 7 x weekly - 10 sets - 1 reps - 10 hold  Supine Straight Leg Raises - 2 x daily - 7 x weekly - 3 sets - 10 reps  Sidelying Hip Abduction - 2 x daily - 7 x weekly - 3 sets - 10 reps  Supine Hip Adduction Isometric with Ball - 1 x daily - 7 x weekly - 10 sets - 10 hold  Supine Heel Slide with Strap - 2 x daily - 7 x weekly - 10 sets - 10 hold  Mini Squat with Counter Support - 1 x daily - 3 x weekly - 3 sets - 10 reps  Standing Heel Raise - 1 x daily - 3 x weekly - 3 sets - 10 reps        Manual Treatments:  PROM / STM / Oscillations-Mobs:  G-I, II, III, IV (PA's, Inf., Post.)  [] (93589) Provided manual therapy to mobilize LE, proximal hip and/or LS spine soft tissue/joints for the purpose of modulating pain, promoting relaxation,  increasing ROM, reducing/eliminating soft tissue swelling/inflammation/restriction, improving soft tissue extensibility and allowing for proper ROM for normal function with self care, mobility, lifting and ambulation. Other:  Patient education on PT and plan of care including diagnosis, prognosis, treatment goals and options. Patient agrees with discussed POC and treatment and is aware of rehab process. Pt was also educated on clinic layout and use of modalities. PT gave pt business card to call if any questions. Modalities: ICE TO GO    Charges:   Timed Code Treatment Minutes: 72'   Total Treatment Minutes: 100'     [] EVAL (LOW) 77782   [] EVAL (MOD) 34848   [] EVAL (HIGH) 34738   [] RE-EVAL   [x] UG(26670) x 2    [] IONTO  [x] NMR (38184) x  1    [] VASO  [] Manual (91103) x      [] Other: GAIT x1  [x] TA x 1     [] Mech Traction (78452)  [] ES(attended) (63515)      [] ES (un) (08317):     GOALS:   Patient stated goal:   Get onto the floor with her kids. Squat all the way down. Return to previous activities. [x] Progressing: [] Met: [] Not Met: [] Adjusted     Therapist goals for Patient:   Short Term Goals: To be achieved in: 2 weeks  1. Independent in HEP and progression per patient tolerance, in order to prevent re-injury. [] Progressing: [x] Met: [] Not Met: [] Adjusted   2. Pt will report pain at worst less than or equal to 4/10. [] Progressing: [x] Met: [] Not Met: [] Adjusted  3. Pt will improve knee ROM to 90 degrees flexion. [] Progressing: [x] Met: [] Not Met: [] Adjusted     Long Term Goals: To be achieved in: 8 weeks  1.  Pt will demo a score 65 or better for FOTO to assist with reaching prior level of function. [x] Progressing: [] Met: [] Not Met: [] Adjusted  2. Patient will demonstrate increased AROM to knee ext to equal to 0 and knee flex greater than or equal to 140-150 degrees to allow for proper joint functioning as indicated by patients Functional Deficits. [] Progressing: [x] Met: [] Not Met: [] Adjusted  3. Patient will demonstrate an increase in strength to hip flex, hip ABD, and knee flex and ext strength to 4+/5 or HHD within 90% of contralateral limb to allow for proper functional mobility as indicated by patients functional deficits. [x] Progressing: [] Met: [] Not Met: [] Adjusted  4. Patient will return to walking with a normal gait pattern and kneeling down with her children without exacerbation of pain/discomfort in her knee. [] Progressing: [x] Met: [] Not Met: [] Adjusted  5. Pt will report pain at worst less than or equal to 0/10. [] Progressing: [x] Met: [] Not Met: [] Adjusted       6. 10-12 weeks: Patient will return to light recreation activities with curling, pickleball, running,etc.   [x] Progressing: [] Met: [] Not Met: [] Adjusted      Overall Progression Towards Functional goals/ Treatment Progress Update:  [] Patient is progressing as expected towards functional goals listed. [] Progression is slowed due to complexities/Impairments listed. [] Progression has been slowed due to co-morbidities. [x] Plan just implemented, too soon to assess goals progression <30days   [] Goals require adjustment due to lack of progress  [] Patient is not progressing as expected and requires additional follow up with physician  [] Other    Prognosis for POC: [x] Good [] Fair  [] Poor      Patient requires continued skilled intervention: [x] Yes  [] No    Treatment/Activity Tolerance:  [x] Patient able to complete treatment  [] Patient limited by fatigue  [] Patient limited by pain     [] Patient limited by other medical complications  [] Other: Pt david tx well.   She was able to replicate curling push off in clinic today without problems. It was noted that she did demo right knee valgus, which was addressed. This could be due to lateral hip weakness, which we focused on in our tx. I've also given her some exercises to work on for her HEP with this. She reported being able to feel that her knee was not normal when running on the AlterG at 70%, but she did not have pain. We will continue formal PT to ensure she is safe to return to curling, and we will work on returning to running as well. Pt is understanding and agreeable to this plan. Recommend continuing tx 1x/wk x 4-6 wks. PLAN: Add slide board next session for curling tech. Progress pt for strengthening to return to sport.     [x] Continue per plan of care [] Alter current plan (see comments above)  [] Plan of care initiated [] Hold pending MD visit [] Discharge    Electronically signed by:  Pauline Palomo PT, DPT, Board-Certified Specialist in [facility] 74

## 2023-01-20 ENCOUNTER — HOSPITAL ENCOUNTER (OUTPATIENT)
Dept: PHYSICAL THERAPY | Age: 43
Setting detail: THERAPIES SERIES
Discharge: HOME | End: 2023-01-20
Payer: COMMERCIAL

## 2023-01-20 PROCEDURE — 97110 THERAPEUTIC EXERCISES: CPT | Performed by: PHYSICAL THERAPIST

## 2023-01-20 PROCEDURE — 97112 NEUROMUSCULAR REEDUCATION: CPT | Performed by: PHYSICAL THERAPIST

## 2023-01-20 PROCEDURE — 97530 THERAPEUTIC ACTIVITIES: CPT | Performed by: PHYSICAL THERAPIST

## 2023-01-20 NOTE — FLOWSHEET NOTE
100 [facility] a Department of [address]  [address], [address]  Office: [phone]  Fax:  [phone]                                                           Physical Therapy Treatment Note/ Progress Report:      Date:  2023    Patient Name:  Jo Slade    :  1980  MRN: [id number]  Restrictions/Precautions:    Medical/Treatment Diagnosis Information:  Diagnosis: S83.241 (ICD-10-CM) - Acute medial meniscus tear of right knee. S/p medial meniscectomy. DOS 22  Treatment Diagnosis: M25.561 - Pain in right knee  Insurance/Certification information:  PT Insurance Information: [place]  Physician Information:  Referring Provider (secondary): Julia Cardoso MD  Has the plan of care been signed (Y/N):        []  Yes  [x]  No     Date of Patient follow up with Physician: prn      Is this a Progress Report:     []  Yes  [x]  No        If Yes:  Date Range for reporting period:  Beginning 2022  Ending 10 Praful 2023    Progress report will be due (10 Rx or 30 days whichever is less): 2023             Visit # Insurance Allowable Auth Required   7 (4 prior used)- 4 in  100 for    []  Yes [x]  No          Functional Scale: Andriette Showers   Date assessed: 1/10/23      Latex Allergy:  [x]NO      []YES  Preferred Language for Healthcare:   [x]English       []other:      Pain level:  0/10    SUBJECTIVE:  5 weeks   She curled 2x over this week without any c/o pain. She is feeling good. She would like to return to running and playing  pickle ball.        OBJECTIVE: 10 Praful 2023   Observation:   Test measurements:          Flexibility L R Comment   Hamstring      Gastroc      ITB      Quad                ROM PROM AROM Overpressure Comment    L R L R L R    Flexion    150      Extension    Hyper 3                              Strength L R Comment   Quad  4+    Hamstring  4+    Gastroc      Hip flexor  4-    Hip ABD  4-                        RESTRICTIONS/PRECAUTIONS: R Medial Meniscectomy on 12/7/2022     Exercises/Interventions:     Exercise/Equipment Resistance and Repetitions Other comments   Stretching     Hamstring 3 x 30 seconds    Hip Flexion     ITB- Rope     Grion     Quad 3x:30 rope   Inclined Calf 3x30 seconds     Towel Pull    Piriformis     Seated Flexion  EOB LLE support             SLR     Supine    Prone    Abduction    clams    Adducton 3x10 reps 2#    SLR+ 3 x 30\" + 10 pumps  ABD- :30 hold, 30x pumps, ABC. Second rep of :30 hold At end of session         Isometrics     Quad sets    Ball Squeezes    Patellar Glides     Medial     Superior     Inferior          ROM     Passive     Active     Weight Shift     Hang Weights     Sheet Pulls     Ankle Pumps     EOB ROM Flexion  EOB LLE support        CKC     Calf raises    Wall sits circuit    Step ups     1 leg stand  Squatting On M/L rocker board; cues for hip ER     CC TKE     Balance     Monster Walks     RDL 3x8    Bridging    Triple threats     Stool Scoots     Alt lunges 2x10         PRE     Extension SL 3x10 10# RANGE:   Flexion SL 3x10 25# RANGE:        Cable Column          Leg Press 3x10 70# RANGE: seat 5        Alter G 2' walk at 70%  1' run/1' walk at 80% x6  1' run/1' walk at 90% x2  2' walk at 100% Medium shorts   Bike    Treadmill     Gait Training     Slider Board Curling simulation         Therapeutic Exercise and NMR EXR  [x] (07776) Provided verbal/tactile cueing for activities related to strengthening, flexibility, endurance, ROM for improvements in LE, proximal hip, and core control with self care, mobility, lifting, ambulation. [x] (75806) Provided verbal/tactile cueing for activities related to improving balance, coordination, kinesthetic sense, posture, motor skill, proprioception  to assist with LE, proximal hip, and core control in self care, mobility, lifting, ambulation and eccentric single leg control.      NMR and Therapeutic Activities:    [x] (36621 or 47793) Provided verbal/tactile cueing for activities related to improving balance, coordination, kinesthetic sense, posture, motor skill, proprioception and motor activation to allow for proper function of core, proximal hip and LE with self care and ADLs  [x] (89612) Gait Re-education- Provided training and instruction to the patient for proper LE, core and proximal hip recruitment and positioning and eccentric body weight control with ambulation re-education including up and down stairs     Home Exercise Program:    [x] (03415) Reviewed/Progressed HEP activities related to strengthening, flexibility, endurance, ROM of core, proximal hip and LE for functional self-care, mobility, lifting and ambulation/stair navigation   [x] (67392)Reviewed/Progressed HEP activities related to improving balance, coordination, kinesthetic sense, posture, motor skill, proprioception of core, proximal hip and LE for self care, mobility, lifting, and ambulation/stair navigation      Access Code: [email or web address]  URL: [email or web address]  Date: 12/16/2022  Prepared by: Abdiaziz Jaramillo    Exercises  Seated Table Hamstring Stretch - 2 x daily - 7 x weekly - 5 sets - 30 hold  Seated Gastroc Stretch with Strap - 2 x daily - 7 x weekly - 5 sets - 30 hold  Supine Single Leg Ankle Pumps - 10 x daily - 7 x weekly - 3 sets - 10 reps  Supine Quadricep Sets - 2 x daily - 7 x weekly - 10 sets - 1 reps - 10 hold  Supine Straight Leg Raises - 2 x daily - 7 x weekly - 3 sets - 10 reps  Sidelying Hip Abduction - 2 x daily - 7 x weekly - 3 sets - 10 reps  Supine Hip Adduction Isometric with Ball - 1 x daily - 7 x weekly - 10 sets - 10 hold  Supine Heel Slide with Strap - 2 x daily - 7 x weekly - 10 sets - 10 hold  Mini Squat with Counter Support - 1 x daily - 3 x weekly - 3 sets - 10 reps  Standing Heel Raise - 1 x daily - 3 x weekly - 3 sets - 10 reps        Manual Treatments:  PROM / STM / Oscillations-Mobs:  G-I, II, III, IV (PA's, Inf., Post.)  [] (02620) Provided manual therapy to mobilize LE, proximal hip and/or LS spine soft tissue/joints for the purpose of modulating pain, promoting relaxation,  increasing ROM, reducing/eliminating soft tissue swelling/inflammation/restriction, improving soft tissue extensibility and allowing for proper ROM for normal function with self care, mobility, lifting and ambulation. Other:  Patient education on PT and plan of care including diagnosis, prognosis, treatment goals and options. Patient agrees with discussed POC and treatment and is aware of rehab process. Pt was also educated on clinic layout and use of modalities. PT gave pt business card to call if any questions. Modalities: ICE TO GO    Charges:  Timed Code Treatment Minutes: 54'   Total Treatment Minutes: 79'     [] EVAL (LOW) 455 1011   [] EVAL (MOD) 04230   [] EVAL (HIGH) 51409   [] RE-EVAL   [x] RH(74490) x 2    [] IONTO  [x] NMR (86763) x  1    [] VASO  [] Manual (43418) x      [] Other: GAIT x1  [x] TA x 1     [] Mech Traction (19975)  [] ES(attended) (78937)      [] ES (un) (93431):     GOALS:   Patient stated goal:   Get onto the floor with her kids. Squat all the way down. Return to previous activities. [x] Progressing: [] Met: [] Not Met: [] Adjusted     Therapist goals for Patient:   Short Term Goals: To be achieved in: 2 weeks  1. Independent in HEP and progression per patient tolerance, in order to prevent re-injury. [] Progressing: [x] Met: [] Not Met: [] Adjusted   2. Pt will report pain at worst less than or equal to 4/10. [] Progressing: [x] Met: [] Not Met: [] Adjusted  3. Pt will improve knee ROM to 90 degrees flexion. [] Progressing: [x] Met: [] Not Met: [] Adjusted     Long Term Goals: To be achieved in: 8 weeks  1. Pt will demo a score 65 or better for FOTO to assist with reaching prior level of function. [x] Progressing: [] Met: [] Not Met: [] Adjusted  2. Patient will demonstrate increased AROM to knee ext to equal to 0 and knee flex greater than or equal to 140-150 degrees to allow for proper joint functioning as indicated by patients Functional Deficits.    [] Progressing: [x] Met: [] Not Met: [] Adjusted  3. Patient will demonstrate an increase in strength to hip flex, hip ABD, and knee flex and ext strength to 4+/5 or HHD within 90% of contralateral limb to allow for proper functional mobility as indicated by patients functional deficits.   [x] Progressing: [] Met: [] Not Met: [] Adjusted  4. Patient will return to walking with a normal gait pattern and kneeling down with her children without exacerbation of pain/discomfort in her knee.  [] Progressing: [x] Met: [] Not Met: [] Adjusted  5. Pt will report pain at worst less than or equal to 0/10.         [] Progressing: [x] Met: [] Not Met: [] Adjusted       6. 10-12 weeks: Patient will return to light recreation activities with curling, pickleball, running,etc.   [x] Progressing: [] Met: [] Not Met: [] Adjusted      Overall Progression Towards Functional goals/ Treatment Progress Update:  [] Patient is progressing as expected towards functional goals listed.    [] Progression is slowed due to complexities/Impairments listed.  [] Progression has been slowed due to co-morbidities.  [x] Plan just implemented, too soon to assess goals progression <30days   [] Goals require adjustment due to lack of progress  [] Patient is not progressing as expected and requires additional follow up with physician  [] Other    Prognosis for POC: [x] Good [] Fair  [] Poor      Patient requires continued skilled intervention: [x] Yes  [] No    Treatment/Activity Tolerance:  [x] Patient able to complete treatment  [] Patient limited by fatigue  [] Patient limited by pain     [] Patient limited by other medical complications  [] Other: Pt david tx well.  She was able to david progression of BW and running without c/o pain.  RDLs were  challenging for balance. She demo good form with running on the [employer] as well. She continues to make good progress. We discussed adding some lateral lunges at home to start pickle ball type activities. Pt would continue to benefit from skilled PT to improve strength and function. PLAN: Add slide board next session for curling tech. Progress pt for strengthening to return to sport. Consider light cutting for pickle ball activities. Progress running BW NV. Consider giving pt home running plan.      [x] Continue per plan of care [] Alter current plan (see comments above)  [] Plan of care initiated [] Hold pending MD visit [] Discharge    Electronically signed by:  Linda Albright, PT, DPT, Board-Certified Specialist in [facility] 74

## 2023-01-27 ENCOUNTER — HOSPITAL ENCOUNTER (OUTPATIENT)
Dept: PHYSICAL THERAPY | Age: 43
Setting detail: THERAPIES SERIES
Discharge: HOME | End: 2023-01-27
Payer: COMMERCIAL

## 2023-01-27 PROCEDURE — 97110 THERAPEUTIC EXERCISES: CPT | Performed by: PHYSICAL THERAPIST

## 2023-01-27 PROCEDURE — 97530 THERAPEUTIC ACTIVITIES: CPT | Performed by: PHYSICAL THERAPIST

## 2023-01-27 PROCEDURE — 97112 NEUROMUSCULAR REEDUCATION: CPT | Performed by: PHYSICAL THERAPIST

## 2023-01-27 NOTE — PLAN OF CARE
100 [facility] a Department of [address]  [address], [address]  Office: [phone]  Fax:  [phone]   Physical Therapy Re-Certification Plan of Care    Dear Dr. Lemuel Hicks,    We had the pleasure of treating the following patient for physical therapy services at [address] [place] [address]. A summary of our findings can be found in the updated assessment below. This includes our plan of care. If you have any questions or concerns regarding these findings, please do not hesitate to contact me at the office phone number checked above. Thank you for the referral.     Physician Signature:________________________________Date:__________________  By signing above (or electronic signature), therapists plan is approved by physician    Date Range Of Visits: 12/9/22-1/27/2023  Total Visits to Date: 12  Overall Response to Treatment:   [] Patient is responding well to treatment and improvement is noted with regards to goals   [] Patient should continue to improve in reasonable time if they continue HEP   [] Patient has plateaued and is no longer responding to skilled PT intervention    [] Patient is getting worse and would benefit from return to referring MD   [] Patient unable to adhere to initial POC   [x] Other: Pt david tx well. We did do several sports related activities today. She david these well without c/o pain. I do feel she could start to return to pickle ball and skate with her kids (easy skating). I did give her a return to run program to follow. She can return to me prn or call with questions. She david progressions well. Pt understands the importance of continuing her HEP, focusing on hip ABD strength at least 2x/wk. Pt to continue as HEP. Pt can return prn. If she doesn't return, this note will be considered as D/C.                                                                  Physical Therapy Treatment Note/ Progress Report:      Date:  2023    Patient Name:  Crow Negrete    :  1980  MRN: [id number]  Restrictions/Precautions:    Medical/Treatment Diagnosis Information:  Diagnosis: S83.241 (ICD-10-CM) - Acute medial meniscus tear of right knee. S/p medial meniscectomy. DOS 22  Treatment Diagnosis: M25.561 - Pain in right knee  Insurance/Certification information:  PT Insurance Information: [facility]  Physician Information:  Referring Provider (secondary): Delano Ramirez MD  Has the plan of care been signed (Y/N):        []  Yes  [x]  No     Date of Patient follow up with Physician: prn      Is this a Progress Report:     []  Yes  [x]  No        If Yes:  Date Range for reporting period:  Beginning 2022  Ending 2023    Progress report will be due (10 Rx or 30 days whichever is less): 2023             Visit # Insurance Allowable Auth Required   7 (4 prior used)- 5 in  100 for    []  Yes [x]  No          Functional Scale: [id number]   Date assessed: 1/10/23      Latex Allergy:  [x]NO      []YES  Preferred Language for Healthcare:   [x]English       []other:      Pain level:  0/10    SUBJECTIVE:  6 weeks   She had no c/o pain after last visit. She has been curling without c/o pain. She is feeling pretty good overall.         OBJECTIVE: 10 Praful 2023   Observation:   Test measurements:          Flexibility L R Comment   Hamstring      Gastroc      ITB      Quad                ROM PROM AROM Overpressure Comment    L R L R L R    Flexion    150      Extension    Hyper 3                              Strength L R Comment   Quad  4+    Hamstring  4+    Gastroc      Hip flexor  4+    Hip ABD  4                        RESTRICTIONS/PRECAUTIONS: R Medial Meniscectomy on 2022     Exercises/Interventions:     Exercise/Equipment Resistance and Repetitions Other comments   Stretching     Hamstring 3 x 30 seconds    Hip Flexion     ITB- Rope     Grion     Quad 3x:30 rope Inclined Calf 3x30 seconds     Towel Pull    Piriformis     Seated Flexion  EOB LLE support             SLR     Supine    Prone    Abduction    clams    Adducton    SLR+ At end of session         Isometrics     Quad sets    Ball Squeezes    Patellar Glides     Medial     Superior     Inferior          ROM     Passive     Active     Weight Shift     Hang Weights     Sheet Pulls     Ankle Pumps     EOB ROM Flexion  EOB LLE support        CKC     Calf raises    Wall sits circuit    Step ups     1 leg stand  Squatting On M/L rocker board; cues for hip ER     CC TKE     Balance     Monster Walks     RDL    Bridging    Triple threats     Stool Scoots     Alt lunges         PRE     Extension SL 3x10 17# RANGE:   Flexion SL 3x10 33# RANGE:        Cable Column          Leg Press 3x10 70# RANGE: seat 5        Alter G 3' walk at 90%  1' run/1' walk at 90% x4  1' run/1' walk at 100% x4  3' walk at 100% Medium shorts   Bike     lateral jumps 10x ea way    Ladder drills 1x down/back  1 in lateral  2 in lateral  Hopscotch  Icky shuffle  Carioca          Treadmill     Gait Training     Slider Board Curling simulation         Therapeutic Exercise and NMR EXR  [x] (63268) Provided verbal/tactile cueing for activities related to strengthening, flexibility, endurance, ROM for improvements in LE, proximal hip, and core control with self care, mobility, lifting, ambulation. [x] (07829) Provided verbal/tactile cueing for activities related to improving balance, coordination, kinesthetic sense, posture, motor skill, proprioception  to assist with LE, proximal hip, and core control in self care, mobility, lifting, ambulation and eccentric single leg control.      NMR and Therapeutic Activities:    [x] (89403 or 88744) Provided verbal/tactile cueing for activities related to improving balance, coordination, kinesthetic sense, posture, motor skill, proprioception and motor activation to allow for proper function of core, proximal hip and LE with self care and ADLs  [x] (02671) Gait Re-education- Provided training and instruction to the patient for proper LE, core and proximal hip recruitment and positioning and eccentric body weight control with ambulation re-education including up and down stairs     Home Exercise Program:    [x] (26576) Reviewed/Progressed HEP activities related to strengthening, flexibility, endurance, ROM of core, proximal hip and LE for functional self-care, mobility, lifting and ambulation/stair navigation   [x] (94010)Reviewed/Progressed HEP activities related to improving balance, coordination, kinesthetic sense, posture, motor skill, proprioception of core, proximal hip and LE for self care, mobility, lifting, and ambulation/stair navigation      Access Code: [email or web address]  URL: [email or web address]  Date: 12/16/2022  Prepared by: Chyna Jaramillo    Exercises  Seated Table Hamstring Stretch - 2 x daily - 7 x weekly - 5 sets - 30 hold  Seated Gastroc Stretch with Strap - 2 x daily - 7 x weekly - 5 sets - 30 hold  Supine Single Leg Ankle Pumps - 10 x daily - 7 x weekly - 3 sets - 10 reps  Supine Quadricep Sets - 2 x daily - 7 x weekly - 10 sets - 1 reps - 10 hold  Supine Straight Leg Raises - 2 x daily - 7 x weekly - 3 sets - 10 reps  Sidelying Hip Abduction - 2 x daily - 7 x weekly - 3 sets - 10 reps  Supine Hip Adduction Isometric with Ball - 1 x daily - 7 x weekly - 10 sets - 10 hold  Supine Heel Slide with Strap - 2 x daily - 7 x weekly - 10 sets - 10 hold  Mini Squat with Counter Support - 1 x daily - 3 x weekly - 3 sets - 10 reps  Standing Heel Raise - 1 x daily - 3 x weekly - 3 sets - 10 reps        Manual Treatments:  PROM / STM / Oscillations-Mobs:  G-I, II, III, IV (PA's, Inf., Post.)  [] (64394) Provided manual therapy to mobilize LE, proximal hip and/or LS spine soft tissue/joints for the purpose of modulating pain, promoting relaxation,  increasing ROM, reducing/eliminating soft tissue swelling/inflammation/restriction, improving soft tissue extensibility and allowing for proper ROM for normal function with self care, mobility, lifting and ambulation. Other:        Modalities: ICE TO GO    Charges:   Timed Code Treatment Minutes: 61'   Total Treatment Minutes: 76'     [] EVAL (LOW) 455 1011   [] EVAL (MOD) 61796   [] EVAL (HIGH) 70939   [] RE-EVAL   [x] FC(52323) x 2    [] IONTO  [x] NMR (46730) x  1    [] VASO  [] Manual (61618) x      [] Other: GAIT x1  [x] TA x 1     [] Mech Traction (05366)  [] ES(attended) (53363)      [] ES (un) (37611):     GOALS:   Patient stated goal:   Get onto the floor with her kids. Squat all the way down. Return to previous activities. [] Progressing: [x] Met: [] Not Met: [] Adjusted     Therapist goals for Patient:   Short Term Goals: To be achieved in: 2 weeks  1. Independent in HEP and progression per patient tolerance, in order to prevent re-injury. [] Progressing: [x] Met: [] Not Met: [] Adjusted   2. Pt will report pain at worst less than or equal to 4/10. [] Progressing: [x] Met: [] Not Met: [] Adjusted  3. Pt will improve knee ROM to 90 degrees flexion. [] Progressing: [x] Met: [] Not Met: [] Adjusted     Long Term Goals: To be achieved in: 8 weeks  1. Pt will demo a score 65 or better for FOTO to assist with reaching prior level of function. [] Progressing: [x] Met: [] Not Met: [] Adjusted  2. Patient will demonstrate increased AROM to knee ext to equal to 0 and knee flex greater than or equal to 140-150 degrees to allow for proper joint functioning as indicated by patients Functional Deficits. [] Progressing: [x] Met: [] Not Met: [] Adjusted  3. Patient will demonstrate an increase in strength to hip flex, hip ABD, and knee flex and ext strength to 4+/5 or HHD within 90% of contralateral limb to allow for proper functional mobility as indicated by patients functional deficits. [x] Progressing: [] Met: [] Not Met: [] Adjusted  4.  Patient will return to walking with a normal gait pattern and kneeling down with her children without exacerbation of pain/discomfort in her knee. [] Progressing: [x] Met: [] Not Met: [] Adjusted  5. Pt will report pain at worst less than or equal to 0/10. [] Progressing: [x] Met: [] Not Met: [] Adjusted       6. 10-12 weeks: Patient will return to light recreation activities with curling, pickleball, running,etc.   [] Progressing: [x] Met: [] Not Met: [] Adjusted      Overall Progression Towards Functional goals/ Treatment Progress Update:  [x] Patient is progressing as expected towards functional goals listed. [] Progression is slowed due to complexities/Impairments listed. [] Progression has been slowed due to co-morbidities. [] Plan just implemented, too soon to assess goals progression <30days   [] Goals require adjustment due to lack of progress  [] Patient is not progressing as expected and requires additional follow up with physician  [] Other    Prognosis for POC: [x] Good [] Fair  [] Poor      Patient requires continued skilled intervention: [x] Yes  [] No    Treatment/Activity Tolerance:  [x] Patient able to complete treatment  [] Patient limited by fatigue  [] Patient limited by pain     [] Patient limited by other medical complications  [] Other: Pt david tx well. We did do several sports related activities today. She david these well without c/o pain. I do feel she could start to return to pickle ball and skate with her kids (easy skating). I did give her a return to run program to follow. She can return to me prn or call with questions. She david progressions well. Pt understands the importance of continuing her HEP, focusing on hip ABD strength at least 2x/wk. Pt to continue as HEP. Pt can return prn. If she doesn't return, this note will be considered as D/C. PLAN: Add slide board next session for curling tech. Progress pt for strengthening to return to sport.  Pt is to continue as HEP.  She can return prn.       [x] Continue per plan of care [] Alter current plan (see comments above)  [] Plan of care initiated [] Hold pending MD visit [] Discharge    Electronically signed by:  Leonette Opitz, PT, DPT, Board-Certified Specialist in [facility] 74

## (undated) DEVICE — COVER LT HNDL BLU PLAS

## (undated) DEVICE — APPLICATOR MEDICATED 26 CC SOLUTION HI LT ORNG CHLORAPREP

## (undated) DEVICE — SPONGE GZ W4XL4IN COT 12 PLY TYP VII WVN C FLD DSGN

## (undated) DEVICE — 3M™ STERI-DRAPE™ U-DRAPE 1015: Brand: STERI-DRAPE™

## (undated) DEVICE — GOWN,REINF,POLY,AURORA,XLNG/XL,STRL: Brand: MEDLINE

## (undated) DEVICE — STRIP,CLOSURE,WOUND,MEDI-STRIP,1/2X4: Brand: MEDLINE

## (undated) DEVICE — BLADE SHV L13CM DIA4MM TAPR TIP SCIS LIKE CUT OVL OUTER

## (undated) DEVICE — TUBING PUMP QUAD LTX ARTHSCP SET

## (undated) DEVICE — STERILE POLYISOPRENE POWDER-FREE SURGICAL GLOVES: Brand: PROTEXIS

## (undated) DEVICE — KNEE ARTHROSCOPY: Brand: MEDLINE INDUSTRIES, INC.

## (undated) DEVICE — ALCOHOL  RUBBING 70PERC ISOPROPYL  16-OZ

## (undated) DEVICE — STERILE LATEX POWDER-FREE SURGICAL GLOVESWITH NITRILE COATING: Brand: PROTEXIS

## (undated) DEVICE — SUTURE NONABSORBABLE MONOFILAMENT 4-0 FS-2 18 IN ETHILON 662H

## (undated) DEVICE — SOLUTION IRRIG 3000ML LAC RINGERS ARTHROMTC PLAS CONT